# Patient Record
Sex: FEMALE | Race: WHITE | NOT HISPANIC OR LATINO | Employment: UNEMPLOYED | URBAN - METROPOLITAN AREA
[De-identification: names, ages, dates, MRNs, and addresses within clinical notes are randomized per-mention and may not be internally consistent; named-entity substitution may affect disease eponyms.]

---

## 2017-04-13 ENCOUNTER — ALLIED HEALTH/NURSE VISIT (OUTPATIENT)
Dept: CARDIOLOGY | Facility: CLINIC | Age: 47
End: 2017-04-13
Attending: INTERNAL MEDICINE
Payer: COMMERCIAL

## 2017-04-13 DIAGNOSIS — I46.9 CARDIAC ARREST (H): Primary | ICD-10-CM

## 2017-04-13 PROCEDURE — 93260 PRGRMG DEV EVAL IMPLTBL SYS: CPT | Mod: 26 | Performed by: INTERNAL MEDICINE

## 2017-04-13 PROCEDURE — 93260 PRGRMG DEV EVAL IMPLTBL SYS: CPT | Mod: ZF

## 2017-04-13 NOTE — PROGRESS NOTES
Pt seen in the [facility] for evaluation and iterative programming of a [employer], Subcutaneous ICD, per MD orders. Today her intrinsic rhythm is SR 64 bpm. Normal ICD function. No arrhythmias recorded. Pt reports that she is feeling well. Battery estimates 81% remaining to KAMRYN. Plan for pt to send a [employer] remote transmission on 7/13/17.

## 2017-04-13 NOTE — MR AVS SNAPSHOT
After Visit Summary   4/13/2017    Lauren Lewis    MRN: [id number]           Patient Information     Date Of Birth          1970        Visit Information        Provider Department      4/13/2017 8:30 AM 1, Uc Cv Device [facility]        Today's Diagnoses     Cardiac arrest (H)    -  1       Follow-ups after your visit        Follow-up notes from your care team     Return in about 6 months (around 10/13/2017) for [place] SQ ICD.      Who to contact     If you have questions or need follow up information about today's clinic visit or your schedule please contact [facility] directly at [phone].  Normal or non-critical lab and imaging results will be communicated to you by Zebra Technologieshart, letter or phone within 4 business days after the clinic has received the results. If you do not hear from us within 7 days, please contact the clinic through Petnett or phone. If you have a critical or abnormal lab result, we will notify you by phone as soon as possible.  Submit refill requests through Syncing.Net or call your pharmacy and they will forward the refill request to us. Please allow 3 business days for your refill to be completed.          Additional Information About Your Visit        MyChart Information     Syncing[employer]Net gives you secure access to your electronic health record. If you see a primary care provider, you can also send messages to your care team and make appointments. If you have questions, please call your primary care clinic.  If you do not have a primary care provider, please call [phone] and they will assist you.        Care EveryWhere ID     This is your Care EveryWhere ID. This could be used by other organizations to access your [place] medical records  [id number]         Blood Pressure from Last 3 Encounters:   12/13/16 128/85   12/08/15 118/80   10/27/15 116/65    Weight from Last 3 Encounters:   12/13/16 77.6 kg (171 lb)   12/08/15 76.3 kg (168 lb 3.2 oz)    10/13/15 77.2 kg (170 lb 4.8 oz)              We Performed the Following     HC PRGRMG DEV EVAL IMPLANTABLE SUBQ LEAD DFB SYSTEM        Primary Care Provider Office Phone # Fax #    Amanda Kimberley Italo Francisco [phone]       [address]  [facility] [address]        Thank you!     Thank you for choosing [facility]  for your care. Our goal is always to provide you with excellent care. Hearing back from our patients is one way we can continue to improve our services. Please take a few minutes to complete the written survey that you may receive in the mail after your visit with us. Thank you!             Your Updated Medication List - Protect others around you: Learn how to safely use, store and throw away your medicines at www.disposemymeds.org.          This list is accurate as of: 4/13/17 10:02 AM.  Always use your most recent med list.                   Brand Name Dispense Instructions for use    metoprolol 100 MG 24 hr tablet    TOPROL XL    90 tablet    Take 1 tablet (100 mg) by mouth daily       Multi-vitamin Tabs tablet      Take 1 tablet by mouth daily       OMEPRAZOLE          PAXIL 30 MG tablet   Generic drug:  PARoxetine      Take 1 tablet by mouth every morning       RECLIPSEN 0.15-30 MG-MCG per tablet   Generic drug:  desogestrel-ethinyl estradiol      Take 1 tablet by mouth daily As directed       SYNTHROID 100 MCG tablet   Generic drug:  levothyroxine      Take 1 tablet by mouth daily       VITAMIN D (CHOLECALCIFEROL) PO      Take 1 capsule by mouth daily

## 2017-06-01 ENCOUNTER — MEDICAL CORRESPONDENCE (OUTPATIENT)
Dept: HEALTH INFORMATION MANAGEMENT | Facility: CLINIC | Age: 47
End: 2017-06-01

## 2017-07-13 ENCOUNTER — TELEPHONE (OUTPATIENT)
Dept: CARDIOLOGY | Facility: CLINIC | Age: 47
End: 2017-07-13

## 2017-07-13 NOTE — TELEPHONE ENCOUNTER
Left voicemail message for patient to connect Latitude home monitor for her ICD check. She is scheduled for a routine ICD Latitude home monitor check today.

## 2017-08-11 ENCOUNTER — ALLIED HEALTH/NURSE VISIT (OUTPATIENT)
Dept: CARDIOLOGY | Facility: CLINIC | Age: 47
End: 2017-08-11
Attending: INTERNAL MEDICINE
Payer: COMMERCIAL

## 2017-08-11 DIAGNOSIS — I42.1 HOCM (HYPERTROPHIC OBSTRUCTIVE CARDIOMYOPATHY) (H): Primary | ICD-10-CM

## 2017-08-11 PROCEDURE — 93296 REM INTERROG EVL PM/IDS: CPT | Mod: ZF

## 2017-08-11 PROCEDURE — 93298 REM INTERROG DEV EVAL SCRMS: CPT | Performed by: INTERNAL MEDICINE

## 2017-08-11 NOTE — PROGRESS NOTES
Pt sent in a routine remote loop recorder check for evaluation per MD order.  Her ICD check does not show any episodes of ventricular arrhythmias.  Her ICD battery is OK and is estimating 77% left.  I spoke to the patient in clinic on 8/11/17 as she was her in clinic for a scheduled ICD check.  The clinic check was NOT done, as [employer] is writing a new software program for the MC2 ICD .  Until the software is ready, it is recommended pts NOT get their ICD checked in a clinical or surgical setting.  Pt states that she only lives 10 minutes away and she was very understanding about us canceling her clinic appt today.  We did go over her remote transmission information with her while she was here in clinic.      Remote ICD

## 2017-08-11 NOTE — MR AVS SNAPSHOT
After Visit Summary   8/11/2017    Lauren Lewis    MRN: [id number]           Patient Information     Date Of Birth          1970        Visit Information        Provider Department      8/11/2017 6:00 AM UC ICD REMOTE [facility]        Today's Diagnoses     HOCM (hypertrophic obstructive cardiomyopathy) (H)    -  1       Follow-ups after your visit        Your next 10 appointments already scheduled     Dec 12, 2017  9:30 AM CST   Ech Complete with UUECHR2   [facility] [place],  Echocardiography ([facility], [facility])    [address] [facility] [id number] [phone]           1. Please bring or wear a comfortable two-piece outfit. 2. You may eat, drink and take your normal medicines. 3. For any questions that cannot be answered, please contact the ordering physician            Dec 12, 2017 10:30 AM CST   Card Cardpul Stress Tst Adult with UUEKGS   [facility] ELECTROCARDIOLOGY ([facility], [facility])    [address] [facility] [address]               Dec 12, 2017  1:00 PM CST   (Arrive by 12:45 PM)   Implanted Defibulator with Uc Cv Device 1   [facility] ([facility])    [address] [place] [address] [phone]            Dec 12, 2017  1:30 PM CST   (Arrive by 1:15 PM)   Return Genetic with Belle Paz MD   [facility] ([facility])    [address] [place] [address] [phone]              Who to contact     If you have questions or need follow up information about today's clinic visit or your schedule please contact [facility] directly at [phone].  Normal or non-critical lab and imaging results will be communicated to you by MyChart, letter or phone within 4 business days after the clinic has received the results. If you do not hear from us within 7 days, please  contact the clinic through Freever or phone. If you have a critical or abnormal lab result, we will notify you by phone as soon as possible.  Submit refill requests through Freever or call your pharmacy and they will forward the refill request to us. Please allow 3 business days for your refill to be completed.          Additional Information About Your Visit        Tape TVhart Information     Freever gives you secure access to your electronic health record. If you see a primary care provider, you can also send messages to your care team and make appointments. If you have questions, please call your primary care clinic.  If you do not have a primary care provider, please call [phone] and they will assist you.        Care EveryWhere ID     This is your Care EveryWhere ID. This could be used by other organizations to access your [place] medical records  [id number]         Blood Pressure from Last 3 Encounters:   12/13/16 128/85   12/08/15 118/80   10/27/15 116/65    Weight from Last 3 Encounters:   12/13/16 77.6 kg (171 lb)   12/08/15 76.3 kg (168 lb 3.2 oz)   10/13/15 77.2 kg (170 lb 4.8 oz)              We Performed the Following     INTERROGATION DEVICE EVAL REMOTE, PACER/ICD        Primary Care Provider Office Phone # Fax #    Amanda Kimberley Francisco [phone]       [address] [place] [address] [address]  [facility] [place] [address]        Equal Access to Services     [facility] : Hadii aad ku hadasho Soomaali, waaxda luqadaha, qaybta kaalmada adeegyada, aimee tan . So [facility] [phone].    ATENCIÓN: Si habla español, tiene a huitron disposición servicios gratuitos de asistencia lingüística. Llame al [phone].    We comply with applicable federal civil rights laws and [place] laws. We do not discriminate on the basis of race, color, national origin, age, disability sex, sexual orientation or gender identity.            Thank you!     Thank you for choosing [facility]  CARE  for your care. Our goal is always to provide you with excellent care. Hearing back from our patients is one way we can continue to improve our services. Please take a few minutes to complete the written survey that you may receive in the mail after your visit with us. Thank you!             Your Updated Medication List - Protect others around you: Learn how to safely use, store and throw away your medicines at www.disposemymeds.org.          This list is accurate as of: 8/11/17  2:21 PM.  Always use your most recent med list.                   Brand Name Dispense Instructions for use Diagnosis    metoprolol 100 MG 24 hr tablet    TOPROL XL    90 tablet    Take 1 tablet (100 mg) by mouth daily    Hypertrophic cardiomyopathy (H)       Multi-vitamin Tabs tablet      Take 1 tablet by mouth daily        OMEPRAZOLE           PAXIL 30 MG tablet   Generic drug:  PARoxetine      Take 1 tablet by mouth every morning        RECLIPSEN 0.15-30 MG-MCG per tablet   Generic drug:  desogestrel-ethinyl estradiol      Take 1 tablet by mouth daily As directed        SYNTHROID 100 MCG tablet   Generic drug:  levothyroxine      Take 1 tablet by mouth daily        VITAMIN D (CHOLECALCIFEROL) PO      Take 1 capsule by mouth daily

## 2017-08-25 ENCOUNTER — MEDICAL CORRESPONDENCE (OUTPATIENT)
Dept: HEALTH INFORMATION MANAGEMENT | Facility: CLINIC | Age: 47
End: 2017-08-25

## 2017-09-12 ENCOUNTER — ALLIED HEALTH/NURSE VISIT (OUTPATIENT)
Dept: CARDIOLOGY | Facility: CLINIC | Age: 47
End: 2017-09-12
Attending: INTERNAL MEDICINE
Payer: COMMERCIAL

## 2017-09-12 DIAGNOSIS — I42.1 HOCM (HYPERTROPHIC OBSTRUCTIVE CARDIOMYOPATHY) (H): Primary | ICD-10-CM

## 2017-09-12 NOTE — PROGRESS NOTES
Pt seen in the [facility] for evaluation and iterative programming of a [employer], subcutaneous ICD, per MD orders. Today her intrinsic rhythm is SR 75 bpm. Normal ICD function. No arrhythmias recorded. Software update done. Pt reports that she is feeling well. Battery estimates 76% remaining to KAMRYN. Plan for pt to RTC in 3 months.  SQ ICD

## 2017-09-12 NOTE — MR AVS SNAPSHOT
After Visit Summary   9/12/2017    Lauren Lewis    MRN: [id number]           Patient Information     Date Of Birth          1970        Visit Information        Provider Department      9/12/2017 11:30 AM 1, Uc Cv Device [facility]        Today's Diagnoses     HOCM (hypertrophic obstructive cardiomyopathy) (H)    -  1       Follow-ups after your visit        Your next 10 appointments already scheduled     Dec 12, 2017  9:30 AM CST   Ech Complete with UUECHR2   [facility] [place],  Echocardiography ([facility])    [address] [facility] [id number] [phone]           1. Please bring or wear a comfortable two-piece outfit. 2. You may eat, drink and take your normal medicines. 3. For any questions that cannot be answered, please contact the ordering physician            Dec 12, 2017 10:30 AM CST   Card Cardpul Stress Tst Adult with UUEKGS   [facility] ELECTROCARDIOLOGY ([facility])    [address] [address]               Dec 12, 2017  1:00 PM CST   (Arrive by 12:45 PM)   Implanted Defibulator with Uc Cv Device 1   [facility] ([facility])    [address] [place] [address] [phone]            Dec 12, 2017  1:30 PM CST   (Arrive by 1:15 PM)   Return Genetic with Belle Paz MD   [facility] ([facility])    [address] [place] [address] [phone]              Who to contact     If you have questions or need follow up information about today's clinic visit or your schedule please contact [facility] directly at [phone].  Normal or non-critical lab and imaging results will be communicated to you by MyChart, letter or phone within 4 business days after the clinic has received the results. If you do not hear from us within 7 days, please  contact the clinic through [employer] or phone. If you have a critical or abnormal lab result, we will notify you by phone as soon as possible.  Submit refill requests through [employer] or call your pharmacy and they will forward the refill request to us. Please allow 3 business days for your refill to be completed.          Additional Information About Your Visit        LEAF Commercial Capitalhart Information     [employer] gives you secure access to your electronic health record. If you see a primary care provider, you can also send messages to your care team and make appointments. If you have questions, please call your primary care clinic.  If you do not have a primary care provider, please call [phone] and they will assist you.        Care EveryWhere ID     This is your Care EveryWhere ID. This could be used by other organizations to access your [place] medical records  [id number]         Blood Pressure from Last 3 Encounters:   12/13/16 128/85   12/08/15 118/80   10/27/15 116/65    Weight from Last 3 Encounters:   12/13/16 77.6 kg (171 lb)   12/08/15 76.3 kg (168 lb 3.2 oz)   10/13/15 77.2 kg (170 lb 4.8 oz)              Today, you had the following     No orders found for display       Primary Care Provider Office Phone # Fax #    Amanda Kimberley Francisco [phone]       [address] [place] [address] [address]  [facility] [address]        Equal Access to Services     [facility]: Hadii ayla ku hadasho Sonikoleali, waaxda luqadaha, qaybta kaalmada adesherly, aimee tan . So [facility] [place] [phone].    ATENCIÓN: Si habla español, tiene a huitron disposición servicios gratuitos de asistencia lingüística. Llame al [phone].    We comply with applicable federal civil rights laws and [place] laws. We do not discriminate on the basis of race, color, national origin, age, disability sex, sexual orientation or gender identity.            Thank you!     Thank you for choosing [facility]  for your  care. Our goal is always to provide you with excellent care. Hearing back from our patients is one way we can continue to improve our services. Please take a few minutes to complete the written survey that you may receive in the mail after your visit with us. Thank you!             Your Updated Medication List - Protect others around you: Learn how to safely use, store and throw away your medicines at www.disposemymeds.org.          This list is accurate as of: 9/12/17  1:11 PM.  Always use your most recent med list.                   Brand Name Dispense Instructions for use Diagnosis    metoprolol 100 MG 24 hr tablet    TOPROL XL    90 tablet    Take 1 tablet (100 mg) by mouth daily    Hypertrophic cardiomyopathy (H)       Multi-vitamin Tabs tablet      Take 1 tablet by mouth daily        OMEPRAZOLE           PAXIL 30 MG tablet   Generic drug:  PARoxetine      Take 1 tablet by mouth every morning        RECLIPSEN 0.15-30 MG-MCG per tablet   Generic drug:  desogestrel-ethinyl estradiol      Take 1 tablet by mouth daily As directed        SYNTHROID 100 MCG tablet   Generic drug:  levothyroxine      Take 1 tablet by mouth daily        VITAMIN D (CHOLECALCIFEROL) PO      Take 1 capsule by mouth daily

## 2017-12-11 ENCOUNTER — PRE VISIT (OUTPATIENT)
Dept: CARDIOLOGY | Facility: CLINIC | Age: 47
End: 2017-12-11

## 2017-12-12 ENCOUNTER — HOSPITAL ENCOUNTER (OUTPATIENT)
Dept: CARDIOLOGY | Facility: CLINIC | Age: 47
Discharge: HOME | End: 2017-12-12
Attending: INTERNAL MEDICINE | Admitting: INTERNAL MEDICINE
Payer: COMMERCIAL

## 2017-12-12 ENCOUNTER — OFFICE VISIT (OUTPATIENT)
Dept: CARDIOLOGY | Facility: CLINIC | Age: 47
End: 2017-12-12
Attending: INTERNAL MEDICINE
Payer: COMMERCIAL

## 2017-12-12 VITALS
WEIGHT: 179.9 LBS | DIASTOLIC BLOOD PRESSURE: 73 MMHG | SYSTOLIC BLOOD PRESSURE: 104 MMHG | OXYGEN SATURATION: 97 % | BODY MASS INDEX: 29.97 KG/M2 | HEART RATE: 83 BPM | HEIGHT: 65 IN

## 2017-12-12 DIAGNOSIS — I42.2 HYPERTROPHIC CARDIOMYOPATHY (H): ICD-10-CM

## 2017-12-12 DIAGNOSIS — I42.1 HOCM (HYPERTROPHIC OBSTRUCTIVE CARDIOMYOPATHY) (H): Primary | ICD-10-CM

## 2017-12-12 LAB
ALBUMIN SERPL-MCNC: 3.3 G/DL (ref 3.4–5)
ALP SERPL-CCNC: 59 U/L (ref 40–150)
ALT SERPL W P-5'-P-CCNC: 22 U/L (ref 0–50)
ANION GAP SERPL CALCULATED.3IONS-SCNC: 8 MMOL/L (ref 3–14)
AST SERPL W P-5'-P-CCNC: 18 U/L (ref 0–45)
BILIRUB SERPL-MCNC: 0.4 MG/DL (ref 0.2–1.3)
BUN SERPL-MCNC: 14 MG/DL (ref 7–30)
CALCIUM SERPL-MCNC: 8.6 MG/DL (ref 8.5–10.1)
CHLORIDE SERPL-SCNC: 104 MMOL/L (ref 94–109)
CO2 SERPL-SCNC: 25 MMOL/L (ref 20–32)
CREAT SERPL-MCNC: 1.06 MG/DL (ref 0.52–1.04)
ERYTHROCYTE [DISTWIDTH] IN BLOOD BY AUTOMATED COUNT: 11.7 % (ref 10–15)
GFR SERPL CREATININE-BSD FRML MDRD: 55 ML/MIN/1.7M2
GLUCOSE SERPL-MCNC: 85 MG/DL (ref 70–99)
HCT VFR BLD AUTO: 42.3 % (ref 35–47)
HGB BLD-MCNC: 14.2 G/DL (ref 11.7–15.7)
MCH RBC QN AUTO: 31.8 PG (ref 26.5–33)
MCHC RBC AUTO-ENTMCNC: 33.6 G/DL (ref 31.5–36.5)
MCV RBC AUTO: 95 FL (ref 78–100)
PLATELET # BLD AUTO: 207 10E9/L (ref 150–450)
POTASSIUM SERPL-SCNC: 3.9 MMOL/L (ref 3.4–5.3)
PROT SERPL-MCNC: 6.7 G/DL (ref 6.8–8.8)
RBC # BLD AUTO: 4.46 10E12/L (ref 3.8–5.2)
SODIUM SERPL-SCNC: 137 MMOL/L (ref 133–144)
T4 FREE SERPL-MCNC: 1.24 NG/DL (ref 0.76–1.46)
TSH SERPL DL<=0.005 MIU/L-ACNC: 4.2 MU/L (ref 0.4–4)
WBC # BLD AUTO: 8.2 10E9/L (ref 4–11)

## 2017-12-12 PROCEDURE — 99214 OFFICE O/P EST MOD 30 MIN: CPT | Mod: 25 | Performed by: INTERNAL MEDICINE

## 2017-12-12 PROCEDURE — 94621 CARDIOPULM EXERCISE TESTING: CPT | Mod: 26 | Performed by: INTERNAL MEDICINE

## 2017-12-12 PROCEDURE — 93306 TTE W/DOPPLER COMPLETE: CPT | Mod: 26 | Performed by: INTERNAL MEDICINE

## 2017-12-12 PROCEDURE — 93306 TTE W/DOPPLER COMPLETE: CPT

## 2017-12-12 PROCEDURE — 93005 ELECTROCARDIOGRAM TRACING: CPT | Mod: ZF

## 2017-12-12 PROCEDURE — 36415 COLL VENOUS BLD VENIPUNCTURE: CPT | Performed by: INTERNAL MEDICINE

## 2017-12-12 PROCEDURE — 80053 COMPREHEN METABOLIC PANEL: CPT | Performed by: INTERNAL MEDICINE

## 2017-12-12 PROCEDURE — 93010 ELECTROCARDIOGRAM REPORT: CPT | Mod: ZP | Performed by: INTERNAL MEDICINE

## 2017-12-12 PROCEDURE — 84439 ASSAY OF FREE THYROXINE: CPT | Performed by: INTERNAL MEDICINE

## 2017-12-12 PROCEDURE — 85027 COMPLETE CBC AUTOMATED: CPT | Performed by: INTERNAL MEDICINE

## 2017-12-12 PROCEDURE — 84443 ASSAY THYROID STIM HORMONE: CPT | Performed by: INTERNAL MEDICINE

## 2017-12-12 PROCEDURE — 99212 OFFICE O/P EST SF 10 MIN: CPT | Mod: 25,ZF

## 2017-12-12 RX ORDER — SIMVASTATIN 20 MG
20 TABLET ORAL AT BEDTIME
COMMUNITY
Start: 2017-07-12

## 2017-12-12 RX ORDER — METOPROLOL SUCCINATE 100 MG/1
100 TABLET, EXTENDED RELEASE ORAL DAILY
Qty: 90 TABLET | Refills: 3 | Status: SHIPPED | OUTPATIENT
Start: 2017-12-12 | End: 2018-12-19

## 2017-12-12 ASSESSMENT — PAIN SCALES - GENERAL: PAINLEVEL: NO PAIN (0)

## 2017-12-12 NOTE — LETTER
12/12/2017      RE: Lauren Lewis  [address] [address]       Dear Colleague,    Thank you for the opportunity to participate in the care of your patient, Lauren Lewis, at the [facility] HEART [facility]. Please see a copy of my visit note below.    HPI:  47-year-old lady with a history of hypertrophic cardiomyopathy with  MYBPC3 mutation, history of V-fib arrest s/p ICD presents for ongoing evaluation and management.  Pt reports that she was been feeling well.   Patient feels very well without chest pain on exertion, orthopnea, paroxysmal nocturnal dyspnea, presyncope, syncope, palpitations or ICD firing.  She denies any problems with her medication, tolerated increase in metoprolol xl at last visit without issues and reports compliance.      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  Fortunately, her children, who are aged 15 and 12, did not have this gene mutation.    SOCIAL HISTORY:  Social History     Social History     Marital Status:      Spouse Name: N/A     Number of Children:  "N/A     Years of Education: N/A     Social History Main Topics     Smoking status: Never Smoker      Smokeless tobacco: None     Alcohol Use: No     Drug Use: No     Sexual Activity:     Partners: Male     Other Topics Concern     None     Social History Narrative       CURRENT MEDICATIONS:  Current Outpatient Prescriptions   Medication Sig Dispense Refill     simvastatin (ZOCOR) 20 MG tablet Take 20 mg by mouth       metoprolol (TOPROL-XL) 100 MG 24 hr tablet Take 1 tablet (100 mg) by mouth daily 90 tablet 3     multivitamin, therapeutic with minerals (MULTI-VITAMIN) TABS Take 1 tablet by mouth daily       levothyroxine (SYNTHROID) 100 MCG tablet Take 1 tablet by mouth daily       PARoxetine (PAXIL) 30 MG tablet Take 1 tablet by mouth every morning       desogestrel-ethinyl estradiol (RECLIPSEN) 0.15-30 MG-MCG per tablet Take 1 tablet by mouth daily As directed       OMEPRAZOLE        VITAMIN D, CHOLECALCIFEROL, PO Take 1 capsule by mouth daily          ROS:   Constitutional: No fever, chills, or sweats. No weight gain/loss.   ENT: No visual disturbance, ear ache, epistaxis, sore throat.   Allergies/Immunologic: Negative.   Respiratory: No cough, hemoptysis.   Cardiovascular: As per HPI.   GI: No nausea, vomiting, hematemesis, melena, or hematochezia.   : No urinary frequency, dysuria, or hematuria.   Integument: Negative.   Psychiatric: Negative.   Neuro: Negative.   Endocrinology: Negative.   Musculoskeletal: Negative.    EXAM:  /73 (BP Location: Left arm, Patient Position: Chair, Cuff Size: Adult Regular)  Pulse 83  Ht 1.651 m (5' 5\")  Wt 81.6 kg (179 lb 14.4 oz)  SpO2 97%  BMI 29.94 kg/m2  General: appears comfortable, alert and articulate  Head: normocephalic, atraumatic  Eyes: anicteric sclera, EOMI  Neck: no adenopathy, 2+ carotids  Orophyarynx: moist mucosa, no lesions, dentition intact  Heart: regular, S1/S2, no murmur, gallop, rub, estimated JVP 7cm.  Lungs: clear, no rales or " wheezing  Abdomen: soft, non-tender, bowel sounds present, no hepatomegaly  Extremities: no clubbing, cyanosis or edema  Neurological: normal speech and affect, no gross motor deficits    Labs:  CBC RESULTS:  Lab Results   Component Value Date    WBC 8.2 12/12/2017    RBC 4.46 12/12/2017    HGB 14.2 12/12/2017    HCT 42.3 12/12/2017    MCV 95 12/12/2017    MCH 31.8 12/12/2017    MCHC 33.6 12/12/2017    RDW 11.7 12/12/2017     12/12/2017       CMP RESULTS:  Lab Results   Component Value Date     12/12/2017    POTASSIUM 3.9 12/12/2017    CHLORIDE 104 12/12/2017    CO2 25 12/12/2017    ANIONGAP 8 12/12/2017    GLC 85 12/12/2017    BUN 14 12/12/2017    CR 1.06 (H) 12/12/2017    GFRESTIMATED 55 (L) 12/12/2017    GFRESTBLACK 67 12/12/2017    ZAKIA 8.6 12/12/2017    BILITOTAL 0.4 12/12/2017    ALBUMIN 3.3 (L) 12/12/2017    ALKPHOS 59 12/12/2017    ALT 22 12/12/2017    AST 18 12/12/2017              Echo today  Interpretation Summary  Normal left ventricular size, thickness, and function. EF 55-60%. Normal left  ventricular filling for age. No regional wall motion abnormalities are seen.  Normal right ventricular size and function.  No significant valvular pathology.  The inferior vena cava was normal in size with preserved respiratory variability. No pericardial effusion.   No change from prior study 12/13/16.      Assessment and Plan:  47-year-old lady with a history of hypertrophic cardiomyopathy with  MYBPC3 mutation, history of V-fib arrest s/p ICD presents for ongoing evaluation and management.   - hypertrophic cardiomyopathy with  MYBPC3 mutation, history of V-fib arrest s/p ICD:  The patient continues to feel well.  Her CPX test today confirms acceptable exercise tolerance and no evidence of hypotension or significant arrhythmias with exercise.  Echo remains grossly stable as documented above. Will continue metoprolol xl 100 mg daily.   Pt aware of hypertrophic cardiomyopathy exercise restrictions.  Pt  also aware of recommendation for all first degree relatives to undergo clinical screening unless they opt to pursue genetic testing and are found to be gene negative as with her children,         Follow-up:  2 years with an echo and device check.  Will be happy to see sooner if change in clinical status or new questions/concerns arise.        Belle Paz MD  Section Head - Advanced Heart Failure, Transplantation and Mechanical Circulatory Support  Co-Director - Adult Congenital and Cardiovascular Genetics Center  Associate Professor of Medicine, [facility]

## 2017-12-12 NOTE — NURSING NOTE
Chief Complaint   Patient presents with     Follow Up For     heart problem - 47 year old female with hypertrophic cardiomyopathy s/p Vfib arrest s/p ICD placement presenting for follow up     Vitals were taken and medications were reconciled.    Brooke Mendez CMA     12:39 PM

## 2017-12-12 NOTE — MR AVS SNAPSHOT
After Visit Summary   12/12/2017    Lauren Lewis    MRN: [id number]           Patient Information     Date Of Birth          1970        Visit Information        Provider Department      12/12/2017 1:00 PM 1, Beatriz [facility]        Today's Diagnoses     HOCM (hypertrophic obstructive cardiomyopathy) (H)    -  1      Care Instructions    It was a pleasure to see you in clinic today.  Please do not hesitate to call us if you have any questions or concerns.  Please return to clinic in 1 year for a ICD check.        Paulina Waters R.N.  Electrophysiology nurse specialist  [facility]  [address] [place] [address]     Valerie Hidalgo  [phone] business hours    After business hours please call [phone], option #4, and ask for Job code [id number].                  Follow-ups after your visit        Additional Services     Follow-Up with Device Clinic           Follow-Up with Device Clinic - 1 year                 Follow-up notes from your care team     Discussed this visit Return for ICD check, Dr Paz.      Your next 10 appointments already scheduled     Dec 12, 2017  1:30 PM CST   (Arrive by 1:15 PM)   Return Genetic with Belle Paz MD   [facility] ([facility])    [address] [place] [address] [phone]              Future tests that were ordered for you today     Open Future Orders        Priority Expected Expires Ordered    Follow-Up with Device Clinic Routine 12/12/2017 3/12/2018 12/12/2017    Follow-Up with Device Clinic - 1 year Routine 12/12/2018 12/27/2019 12/12/2017            Who to contact     If you have questions or need follow up information about today's clinic visit or your schedule please contact [facility] directly at [phone].  Normal or non-critical lab and imaging results will be communicated to you by Suhas  letter or phone within 4 business days after the clinic has received the results. If you do not hear from us within 7 days, please contact the clinic through Avidia or phone. If you have a critical or abnormal lab result, we will notify you by phone as soon as possible.  Submit refill requests through Avidia or call your pharmacy and they will forward the refill request to us. Please allow 3 business days for your refill to be completed.          Additional Information About Your Visit        Lavish SkateharJackpocket Information     Avidia gives you secure access to your electronic health record. If you see a primary care provider, you can also send messages to your care team and make appointments. If you have questions, please call your primary care clinic.  If you do not have a primary care provider, please call [phone] and they will assist you.        Care EveryWhere ID     This is your Care EveryWhere ID. This could be used by other organizations to access your [place] medical records  [id number]         Blood Pressure from Last 3 Encounters:   12/13/16 128/85   12/08/15 118/80   10/27/15 116/65    Weight from Last 3 Encounters:   12/13/16 77.6 kg (171 lb)   12/08/15 76.3 kg (168 lb 3.2 oz)   10/13/15 77.2 kg (170 lb 4.8 oz)               Primary Care Provider Office Phone # Fax #    Amanda Kimberley Francisco [phone]       [address] [place] [address] [address] [place] [address]        Equal Access to Services     GUEVARA TUCKER AH: Hadii aad ku hadasho Somounika, waaxda luqadaha, qaybta kaalmada cheyyadelmi, aimee travis. So [facility] [phone].    ATENCIÓN: Si habla español, tiene a huitron disposición servicios gratuitos de asistencia lingüística. Llame al [phone].    We comply with applicable federal civil rights laws and [place] laws. We do not discriminate on the basis of race, color, national origin, age, disability, sex, sexual orientation, or gender identity.             Thank you!     Thank you for choosing [facility]  for your care. Our goal is always to provide you with excellent care. Hearing back from our patients is one way we can continue to improve our services. Please take a few minutes to complete the written survey that you may receive in the mail after your visit with us. Thank you!             Your Updated Medication List - Protect others around you: Learn how to safely use, store and throw away your medicines at www.disposemymeds.org.          This list is accurate as of: 12/12/17  1:02 PM.  Always use your most recent med list.                   Brand Name Dispense Instructions for use Diagnosis    metoprolol 100 MG 24 hr tablet    TOPROL XL    90 tablet    Take 1 tablet (100 mg) by mouth daily    Hypertrophic cardiomyopathy (H)       Multi-vitamin Tabs tablet      Take 1 tablet by mouth daily        OMEPRAZOLE           PAXIL 30 MG tablet   Generic drug:  PARoxetine      Take 1 tablet by mouth every morning        RECLIPSEN 0.15-30 MG-MCG per tablet   Generic drug:  desogestrel-ethinyl estradiol      Take 1 tablet by mouth daily As directed        simvastatin 20 MG tablet    ZOCOR     Take 20 mg by mouth        SYNTHROID 100 MCG tablet   Generic drug:  levothyroxine      Take 1 tablet by mouth daily        VITAMIN D (CHOLECALCIFEROL) PO      Take 1 capsule by mouth daily

## 2017-12-12 NOTE — MR AVS SNAPSHOT
"              After Visit Summary   12/12/2017    Lauren Lewis    MRN: [id number]           Patient Information     Date Of Birth          1970        Visit Information        Provider Department      12/12/2017 1:30 PM Belle Paz MD [facility]        Today's Diagnoses     Hypertrophic cardiomyopathy (H)          Care Instructions    You were seen today in the Adult Congenital and Cardiovascular Genetics Clinic at the [facility].    Cardiology Providers you saw during your visit:  Dr. Belle Paz     Diagnosis:  Hypertrophic Cardiomyopathy    Results:  The results of your echo and stress test were discussed with you today.    Recommendations:    1.  Continue to eat a heart healthy, low salt diet.  2.  Continue to get 20-30 minutes of aerobic activity, 4-5 days per week.  Examples of aerobic activity include walking, running, swimming, cycling, etc.  3.  Continue to observe good oral hygiene, with regular dental visits.  4.  Please have labs drawn today (CBC, TSH and CMP).  5. Continue your regular device checks.       Vitals:    12/12/17 1234   BP: 104/73   BP Location: Left arm   Patient Position: Chair   Cuff Size: Adult Regular   Pulse: 83   SpO2: 97%   Weight: 81.6 kg (179 lb 14.4 oz)   Height: 1.651 m (5' 5\")       Exercise restrictions:   Yes__X__  No____         If yes, list restrictions:  Must be allowed to rest if fatigued or SOB, follow HOCM exercise restrictions.      Work restrictions:  Yes____  No____         If yes, list restrictions:    FASTING CHOLESTEROL was checked in the last 5 years YES_X__  NO___ 2017  Continue to eat a heart healthy, low salt diet.         ____ Fasting lipid panel order today         ____ No changes in medications          ____ I recommend the following changes in your cholesterol medications.:          ____ Please follow up for cholesterol screening at your primary care physician      Follow-up:  Follow up with Dr. Paz in 2 " years with an echo and device check.     For after hours urgent needs, call [phone] and ask to speak to the Adult Congenital Physician on call.  Mention Job Code 0401.    For emergencies call 911.    For any scheduling needs and to contact your nurse in the Adult Congenital and Cardiovascular Genetics Clinic, please call Davian Fish, Procedure , at [phone].    Thank you for your visit today!  If you have questions or concerns about today's visit, please call me.    Ngozi Ly RN, BSN  Cardiology Care Coordinator  [facility]  [email or web address]  Ph.[phone]    [facility]  [facility]  Mail Code [id number]  [address] [place]  [address] d          Follow-ups after your visit        Additional Services     Follow-Up with Cardiologist           Follow-Up with Device Clinic                 Your next 10 appointments already scheduled     Dec 12, 2017  3:00 PM CST   Lab with  LAB    [facility] ([facility])    [address] [place] [address] [phone]              Future tests that were ordered for you today     Open Future Orders        Priority Expected Expires Ordered    TSH with free T4 reflex Routine 12/12/2017 12/12/2018 12/12/2017    CBC with platelets Routine 12/12/2017 12/12/2018 12/12/2017    Comprehensive metabolic panel Routine 12/12/2017 12/12/2018 12/12/2017    Follow-Up with Cardiologist Routine 12/12/2019 12/13/2019 12/12/2017    Follow-Up with Device Clinic Routine 12/12/2019 12/13/2019 12/12/2017    Follow-Up with Device Clinic Routine 12/12/2017 3/12/2018 12/12/2017    Follow-Up with Device Clinic - 1 year Routine 12/12/2018 12/27/2019 12/12/2017            Who to contact     If you have questions or need follow up information about today's clinic visit or your schedule please contact [facility] directly at [phone].  Normal or non-critical lab and imaging results will be communicated to you by MyChart, letter or phone within 4 business days after the clinic has received the results. If you do not hear from us within 7 days, please contact the clinic through Llesianthart or phone. If you have a critical or abnormal lab result, we will notify you by phone as soon as possible.  Submit refill requests through Chatosity or call your pharmacy and they will forward the refill request to us. Please allow 3 business days for your refill to be completed.          Additional Information About Your Visit        LlesiantharGentronix Information     Chatosity gives you secure access to your electronic health record. If you see a primary care provider, you can also send messages to your care team and make appointments. If you have questions, please call your primary care clinic.  If you do not have a primary care provider, please call [phone] and they will assist you.        Care EveryWhere ID     This is your Care EveryWhere ID. This could be used by other organizations to access your [place] medical records  [id number]        Your Vitals Were     Pulse Height Pulse Oximetry BMI (Body Mass Index)          83 1.651 m (5' 5\") 97% 29.94 kg/m2         Blood Pressure from Last 3 Encounters:   12/12/17 104/73   12/13/16 128/85   12/08/15 118/80    Weight from Last 3 Encounters:   12/12/17 81.6 kg (179 lb 14.4 oz)   12/13/16 77.6 kg (171 lb)   12/08/15 76.3 kg (168 lb 3.2 oz)                 Where to get your medicines      These medications were sent to [facility] - [place], [place] - [address] [facility] [address] [facility]  [address] [facility]   [address] [facility] [address]     Phone:  [phone]     metoprolol 100 MG 24 hr tablet          Primary Care Provider Office Phone # Fax #    Amanda Kimberley Francisco [phone]       [address] [place] [address]" [phone]        Equal Access to Services     [facility]: Hadii aad svitlana samantha Ayers, waviolada luqnoe, qaybta severobarbiaimee basilio. So [facility] [phone].    ATENCIÓN: Si habla español, tiene a huitron disposición servicios gratuitos de asistencia lingüística. Llame al [phone].    We comply with applicable federal civil rights laws and [place] laws. We do not discriminate on the basis of race, color, national origin, age, disability, sex, sexual orientation, or gender identity.            Thank you!     Thank you for choosing [facility]  for your care. Our goal is always to provide you with excellent care. Hearing back from our patients is one way we can continue to improve our services. Please take a few minutes to complete the written survey that you may receive in the mail after your visit with us. Thank you!             Your Updated Medication List - Protect others around you: Learn how to safely use, store and throw away your medicines at www.disposemymeds.org.          This list is accurate as of: 12/12/17  2:23 PM.  Always use your most recent med list.                   Brand Name Dispense Instructions for use Diagnosis    metoprolol 100 MG 24 hr tablet    TOPROL-XL    90 tablet    Take 1 tablet (100 mg) by mouth daily    Hypertrophic cardiomyopathy (H)       Multi-vitamin Tabs tablet      Take 1 tablet by mouth daily        OMEPRAZOLE           PAXIL 30 MG tablet   Generic drug:  PARoxetine      Take 1 tablet by mouth every morning        RECLIPSEN 0.15-30 MG-MCG per tablet   Generic drug:  desogestrel-ethinyl estradiol      Take 1 tablet by mouth daily As directed        simvastatin 20 MG tablet    ZOCOR     Take 20 mg by mouth        SYNTHROID 100 MCG tablet   Generic drug:  levothyroxine      Take 1 tablet by mouth daily        VITAMIN D (CHOLECALCIFEROL) PO      Take 1 capsule by mouth daily

## 2017-12-12 NOTE — PATIENT INSTRUCTIONS
It was a pleasure to see you in clinic today.  Please do not hesitate to call us if you have any questions or concerns.  Please return to clinic in 1 year for a ICD check.        Paulina Waters R.N.  Electrophysiology nurse specialist  [facility]  [address] [facility] [address]     Valerie Hidalgo  [phone] business hours    After business hours please call [phone], option #4, and ask for Job code [id number].

## 2017-12-12 NOTE — PATIENT INSTRUCTIONS
"You were seen today in the Adult Congenital and Cardiovascular Genetics Clinic at the [facility].    Cardiology Providers you saw during your visit:  Dr. Belle Paz     Diagnosis:  Hypertrophic Cardiomyopathy    Results:  The results of your echo and stress test were discussed with you today.    Recommendations:    1.  Continue to eat a heart healthy, low salt diet.  2.  Continue to get 20-30 minutes of aerobic activity, 4-5 days per week.  Examples of aerobic activity include walking, running, swimming, cycling, etc.  3.  Continue to observe good oral hygiene, with regular dental visits.  4.  Please have labs drawn today (CBC, TSH and CMP).  5. Continue your regular device checks.       Vitals:    12/12/17 1234   BP: 104/73   BP Location: Left arm   Patient Position: Chair   Cuff Size: Adult Regular   Pulse: 83   SpO2: 97%   Weight: 81.6 kg (179 lb 14.4 oz)   Height: 1.651 m (5' 5\")       Exercise restrictions:   Yes__X__  No____         If yes, list restrictions:  Must be allowed to rest if fatigued or SOB, follow HOCM exercise restrictions.      Work restrictions:  Yes____  No____         If yes, list restrictions:    FASTING CHOLESTEROL was checked in the last 5 years YES_X__  NO___ 2017  Continue to eat a heart healthy, low salt diet.         ____ Fasting lipid panel order today         ____ No changes in medications          ____ I recommend the following changes in your cholesterol medications.:          ____ Please follow up for cholesterol screening at your primary care physician      Follow-up:  Follow up with Dr. Paz in 2 years with an echo and device check.     For after hours urgent needs, call [phone] and ask to speak to the Adult Congenital Physician on call.  Mention Job Code 0401.    For emergencies call 911.    For any scheduling needs and to contact your nurse in the Adult Congenital and Cardiovascular Genetics Clinic, please call Davian Fish, Procedure , at " [phone].    Thank you for your visit today!  If you have questions or concerns about today's visit, please call me.    Ngozi Ly RN, BSN  Cardiology Care Coordinator  [facility]  [email or web address]  Ph.[phone]    [facility]  [facility]  Mail Code [id number]  [address] [place]  [address] d

## 2017-12-12 NOTE — PROGRESS NOTES
Pt seen in clinic for evaluation and iterative programming of her [employer] SQ ICD per MD order.  She looks well and she states that she has been doing well and she has a routine appt to see Dr Paz in clinic today.  Her ICD check does not show any episodes of arrhythmias, her impedence is good, the battery has 79% remaining.  Normal ICD function.  We will plan to see her back in clinic in 1 year with remotes q 3 mos.    SQ ICD
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
## 2018-03-13 ENCOUNTER — ALLIED HEALTH/NURSE VISIT (OUTPATIENT)
Dept: CARDIOLOGY | Facility: CLINIC | Age: 48
End: 2018-03-13
Attending: INTERNAL MEDICINE
Payer: COMMERCIAL

## 2018-03-13 DIAGNOSIS — I42.1 HOCM (HYPERTROPHIC OBSTRUCTIVE CARDIOMYOPATHY) (H): Primary | ICD-10-CM

## 2018-03-13 PROCEDURE — 93296 REM INTERROG EVL PM/IDS: CPT | Mod: ZF

## 2018-03-13 PROCEDURE — 93295 DEV INTERROG REMOTE 1/2/MLT: CPT | Performed by: INTERNAL MEDICINE

## 2018-03-13 NOTE — MR AVS SNAPSHOT
After Visit Summary   3/13/2018    Lauren Lewis    MRN: [id number]           Patient Information     Date Of Birth          1970        Visit Information        Provider Department      3/13/2018 6:00 AM [facility] ICD REMOTE [facility]        Today's Diagnoses     HOCM (hypertrophic obstructive cardiomyopathy) (H)    -  1      Care Instructions    It was a pleasure to see you in clinic today.  Please do not hesitate to call with any questions or concerns.  You are scheduled for a remote transmission on 6/25/18.  We look forward to seeing you in clinic at your next device check in 6 months.    Ade Monreal, RN, MS, CCRN  Electrophysiology Nurse Clinician  [facility]    During Business Hours Please Call:  [phone]  After Hours Please Call:  [phone] - select option #4 and ask for job code [phone]                        Follow-ups after your visit        Who to contact     If you have questions or need follow up information about today's clinic visit or your schedule please contact [facility] directly at [phone].  Normal or non-critical lab and imaging results will be communicated to you by Evedhart, letter or phone within 4 business days after the clinic has received the results. If you do not hear from us within 7 days, please contact the clinic through Compasst or phone. If you have a critical or abnormal lab result, we will notify you by phone as soon as possible.  Submit refill requests through [employer] or call your pharmacy and they will forward the refill request to us. Please allow 3 business days for your refill to be completed.          Additional Information About Your Visit        EvedharTempoIQ Information     [employer] gives you secure access to your electronic health record. If you see a primary care provider, you can also send messages to your care team and make appointments. If you have questions, please call your primary care clinic.  If you  do not have a primary care provider, please call [phone] and they will assist you.        Care EveryWhere ID     This is your Care EveryWhere ID. This could be used by other organizations to access your [place] medical records  [id number]         Blood Pressure from Last 3 Encounters:   12/12/17 104/73   12/13/16 128/85   12/08/15 118/80    Weight from Last 3 Encounters:   12/12/17 81.6 kg (179 lb 14.4 oz)   12/13/16 77.6 kg (171 lb)   12/08/15 76.3 kg (168 lb 3.2 oz)              We Performed the Following     INTERROGATION DEVICE EVAL REMOTE, PACER/ICD        Primary Care Provider Office Phone # Fax #    Amanda Kimberley Francisco [phone]       [address] [place] [phone]  [facility] [address]        Equal Access to Services     [facility]: Hadii aad ku hadasho Sonikoleali, waaxda luqadaha, qaybta kaalmada adeegyada, waxay frankiein hayaan chey tan . So [facility] [phone].    ATENCIÓN: Si habla español, tiene a huitron disposición servicios gratuitos de asistencia lingüística. Annabelle al [phone].    We comply with applicable federal civil rights laws and [place] laws. We do not discriminate on the basis of race, color, national origin, age, disability, sex, sexual orientation, or gender identity.            Thank you!     Thank you for choosing [facility]  for your care. Our goal is always to provide you with excellent care. Hearing back from our patients is one way we can continue to improve our services. Please take a few minutes to complete the written survey that you may receive in the mail after your visit with us. Thank you!             Your Updated Medication List - Protect others around you: Learn how to safely use, store and throw away your medicines at www.disposemymeds.org.          This list is accurate as of 3/13/18 11:59 PM.  Always use your most recent med list.                   Brand Name Dispense Instructions for use Diagnosis    metoprolol succinate  100 MG 24 hr tablet    TOPROL-XL    90 tablet    Take 1 tablet (100 mg) by mouth daily    Hypertrophic cardiomyopathy (H)       Multi-vitamin Tabs tablet      Take 1 tablet by mouth daily        OMEPRAZOLE           PAXIL 30 MG tablet   Generic drug:  PARoxetine      Take 1 tablet by mouth every morning        RECLIPSEN 0.15-30 MG-MCG per tablet   Generic drug:  desogestrel-ethinyl estradiol      Take 1 tablet by mouth daily As directed        simvastatin 20 MG tablet    ZOCOR     Take 20 mg by mouth        SYNTHROID 100 MCG tablet   Generic drug:  levothyroxine      Take 1 tablet by mouth daily        VITAMIN D (CHOLECALCIFEROL) PO      Take 1 capsule by mouth daily

## 2018-03-20 NOTE — PATIENT INSTRUCTIONS
It was a pleasure to see you in clinic today.  Please do not hesitate to call with any questions or concerns.  You are scheduled for a remote transmission on 6/25/18.  We look forward to seeing you in clinic at your next device check in 6 months.    Ade Monreal, RN, MS, CCRN  Electrophysiology Nurse Clinician  [facility]    During Business Hours Please Call:  [phone]  After Hours Please Call:  [phone] - select option #4 and ask for job code [phone]

## 2018-03-20 NOTE — PROGRESS NOTES
Preliminary Device Interrogation Results.  Final physician signed paceart report to be scanned and attached.    Remote ICD transmission received and reviewed.  Device transmission sent per MD orders.  Patient has a [employer] SQ ICD.  Normal ICD function.  No arrythmias recorded.  Presenting EGM = NSR @ 77 bpm.  Remaining battery life to KAMRYN = 70%.  Electrode impedance status = OK.  Patient notified of interrogation results via voicemail.  Plan for patient to send a remote transmission in 3 months as scheduled.    Remote ICD transmission

## 2018-07-09 ENCOUNTER — ALLIED HEALTH/NURSE VISIT (OUTPATIENT)
Dept: CARDIOLOGY | Facility: CLINIC | Age: 48
End: 2018-07-09
Attending: INTERNAL MEDICINE
Payer: COMMERCIAL

## 2018-07-09 DIAGNOSIS — I42.1 HOCM (HYPERTROPHIC OBSTRUCTIVE CARDIOMYOPATHY) (H): Primary | ICD-10-CM

## 2018-07-09 PROCEDURE — 93295 DEV INTERROG REMOTE 1/2/MLT: CPT | Performed by: INTERNAL MEDICINE

## 2018-07-09 PROCEDURE — 93296 REM INTERROG EVL PM/IDS: CPT | Mod: ZF

## 2018-07-09 NOTE — MR AVS SNAPSHOT
After Visit Summary   7/9/2018    Lauren Lewis    MRN: [id number]           Patient Information     Date Of Birth          1970        Visit Information        Provider Department      7/9/2018 6:00 AM [employer] REMOTE [facility]        Today's Diagnoses     HOCM (hypertrophic obstructive cardiomyopathy) (H)    -  1       Follow-ups after your visit        Who to contact     If you have questions or need follow up information about today's clinic visit or your schedule please contact [facility] directly at [phone].  Normal or non-critical lab and imaging results will be communicated to you by Blykhart, letter or phone within 4 business days after the clinic has received the results. If you do not hear from us within 7 days, please contact the clinic through Dynadmict or phone. If you have a critical or abnormal lab result, we will notify you by phone as soon as possible.  Submit refill requests through [employer] or call your pharmacy and they will forward the refill request to us. Please allow 3 business days for your refill to be completed.          Additional Information About Your Visit        MyChart Information     [employer] gives you secure access to your electronic health record. If you see a primary care provider, you can also send messages to your care team and make appointments. If you have questions, please call your primary care clinic.  If you do not have a primary care provider, please call [phone] and they will assist you.        Care EveryWhere ID     This is your Care EveryWhere ID. This could be used by other organizations to access your [place] medical records  [id number]         Blood Pressure from Last 3 Encounters:   12/12/17 104/73   12/13/16 128/85   12/08/15 118/80    Weight from Last 3 Encounters:   12/12/17 81.6 kg (179 lb 14.4 oz)   12/13/16 77.6 kg (171 lb)   12/08/15 76.3 kg (168 lb 3.2 oz)              We Performed the Following      INTERROGATION DEVICE EVAL REMOTE, PACER/ICD        Primary Care Provider Office Phone # Fax #    Amanda Chu Italo Francisco [phone]       [address] [place] [address] [address] [place] [address]        Equal Access to Services     GIGIRACHEL CAITLIN : Hadii aad ku hadarceliao Soomaali, waaxda luqadaha, qaybta kaalmada adeegyada, waxay idiin hayaan adeguanakito khalicia dominick travis. So [facility] [phone].    ATENCIÓN: Si habla español, tiene a huitron disposición servicios gratuitos de asistencia lingüística. Llame al [phone].    We comply with applicable federal civil rights laws and [place] laws. We do not discriminate on the basis of race, color, national origin, age, disability, sex, sexual orientation, or gender identity.            Thank you!     Thank you for choosing [facility]  for your care. Our goal is always to provide you with excellent care. Hearing back from our patients is one way we can continue to improve our services. Please take a few minutes to complete the written survey that you may receive in the mail after your visit with us. Thank you!             Your Updated Medication List - Protect others around you: Learn how to safely use, store and throw away your medicines at www.disposemymeds.org.          This list is accurate as of 7/9/18 11:59 PM.  Always use your most recent med list.                   Brand Name Dispense Instructions for use Diagnosis    metoprolol succinate 100 MG 24 hr tablet    TOPROL-XL    90 tablet    Take 1 tablet (100 mg) by mouth daily    Hypertrophic cardiomyopathy (H)       Multi-vitamin Tabs tablet      Take 1 tablet by mouth daily        OMEPRAZOLE           PAXIL 30 MG tablet   Generic drug:  PARoxetine      Take 1 tablet by mouth every morning        RECLIPSEN 0.15-30 MG-MCG per tablet   Generic drug:  desogestrel-ethinyl estradiol      Take 1 tablet by mouth daily As directed        simvastatin 20 MG tablet    ZOCOR     Take 20 mg by mouth        SYNTHROID 100  MCG tablet   Generic drug:  levothyroxine      Take 1 tablet by mouth daily        VITAMIN D (CHOLECALCIFEROL) PO      Take 1 capsule by mouth daily

## 2018-07-12 NOTE — PROGRESS NOTES
Preliminary Device Interrogation Results.  Final physician signed paceart report to be scanned and attached.    Remote ICD transmission received and reviewed.  Device transmission sent per MD orders.  Patient has a [employer] SQ ICD.  Normal ICD function.  No episodes recorded.  Presenting EGM = NSR @ 79 bpm.  Remaining battery life to KAMRYN = 67%.  Electrode impedance status = OK.  Patient notified of interrogation results.  Patient reports that she is feeling well and denies complaints.  Plan for patient to send a remote transmission in 3 months as scheduled.    Remote ICD transmission

## 2018-08-04 ENCOUNTER — HEALTH MAINTENANCE LETTER (OUTPATIENT)
Age: 48
End: 2018-08-04

## 2018-10-30 ENCOUNTER — ALLIED HEALTH/NURSE VISIT (OUTPATIENT)
Dept: CARDIOLOGY | Facility: CLINIC | Age: 48
End: 2018-10-30
Attending: INTERNAL MEDICINE
Payer: COMMERCIAL

## 2018-10-30 DIAGNOSIS — I42.1 HOCM (HYPERTROPHIC OBSTRUCTIVE CARDIOMYOPATHY) (H): Primary | ICD-10-CM

## 2018-10-30 PROCEDURE — 93295 DEV INTERROG REMOTE 1/2/MLT: CPT | Performed by: INTERNAL MEDICINE

## 2018-10-30 PROCEDURE — 93296 REM INTERROG EVL PM/IDS: CPT | Mod: ZF

## 2018-10-30 NOTE — MR AVS SNAPSHOT
After Visit Summary   10/30/2018    Lauren Lewis    MRN: [id number]           Patient Information     Date Of Birth          1970        Visit Information        Provider Department      10/30/2018 6:00 AM [facility]        Today's Diagnoses     HOCM (hypertrophic obstructive cardiomyopathy) (H)    -  1       Follow-ups after your visit        Your next 10 appointments already scheduled     Nov 19, 2018   Procedure with Marina Morris MD   [facility] ([facility])    [address]  [place] [address] [phone]           [facility] is located at [address] [place]              Who to contact     If you have questions or need follow up information about today's clinic visit or your schedule please contact [facility] directly at [phone].  Normal or non-critical lab and imaging results will be communicated to you by MyChart, letter or phone within 4 business days after the clinic has received the results. If you do not hear from us within 7 days, please contact the clinic through [employer] or phone. If you have a critical or abnormal lab result, we will notify you by phone as soon as possible.  Submit refill requests through [employer] or call your pharmacy and they will forward the refill request to us. Please allow 3 business days for your refill to be completed.          Additional Information About Your Visit        MyChart Information     [employer] gives you secure access to your electronic health record. If you see a primary care provider, you can also send messages to your care team and make appointments. If you have questions, please call your primary care clinic.  If you do not have a primary care provider, please call [phone] and they will assist you.        Care EveryWhere ID     This is your Care EveryWhere ID. This could be used by other organizations to access your [place]  medical records  [id number]         Blood Pressure from Last 3 Encounters:   12/12/17 104/73   12/13/16 128/85   12/08/15 118/80    Weight from Last 3 Encounters:   12/12/17 81.6 kg (179 lb 14.4 oz)   12/13/16 77.6 kg (171 lb)   12/08/15 76.3 kg (168 lb 3.2 oz)              We Performed the Following     INTERROGATION DEVICE EVAL REMOTE, PACER/ICD        Primary Care Provider Office Phone # Fax #    Amanda Kimberley Francisco [phone] [address] [facility] [address]        Equal Access to Services     RACHEL TUCKER : Hadii ayla poloo Andria, waaxda luqadaha, qaybta kaalmada adeguanakitoyadelmi, aimee tan . So [facility] [phone].    ATENCIÓN: Si habla español, tiene a huitron disposición servicios gratuitos de asistencia lingüística. Llame al [phone].    We comply with applicable federal civil rights laws and [place] laws. We do not discriminate on the basis of race, color, national origin, age, disability, sex, sexual orientation, or gender identity.            Thank you!     Thank you for choosing [facility]  for your care. Our goal is always to provide you with excellent care. Hearing back from our patients is one way we can continue to improve our services. Please take a few minutes to complete the written survey that you may receive in the mail after your visit with us. Thank you!             Your Updated Medication List - Protect others around you: Learn how to safely use, store and throw away your medicines at www.disposemymeds.org.          This list is accurate as of 10/30/18 11:59 PM.  Always use your most recent med list.                   Brand Name Dispense Instructions for use Diagnosis    metoprolol succinate 100 MG 24 hr tablet    TOPROL-XL    90 tablet    Take 1 tablet (100 mg) by mouth daily    Hypertrophic cardiomyopathy (H)       Multi-vitamin Tabs tablet      Take 1 tablet by mouth daily        OMEPRAZOLE           PAXIL 30  MG tablet   Generic drug:  PARoxetine      Take 1 tablet by mouth every morning        RECLIPSEN 0.15-30 MG-MCG per tablet   Generic drug:  desogestrel-ethinyl estradiol      Take 1 tablet by mouth daily As directed        simvastatin 20 MG tablet    ZOCOR     Take 20 mg by mouth        SYNTHROID 100 MCG tablet   Generic drug:  levothyroxine      Take 1 tablet by mouth daily        VITAMIN D (CHOLECALCIFEROL) PO      Take 1 capsule by mouth daily

## 2018-10-31 NOTE — PROGRESS NOTES
Preliminary Device Interrogation Results.  Final physician signed paceart report to be scanned and attached.    Remote ICD transmission received and reviewed.  Device transmission sent per MD orders.  Patient has a [employer] SQ ICD.  Normal ICD function.  No episodes recorded.  Presenting EGM = NSR @ 94 bpm. Remaining battery life to KAMRYN = 63%.  Electrode impedance status = OK.  Patient notified of interrogation results.  Patient reports that she is feeling well and denies complaints.  Plan for patient to send a remote transmission in 3 months as scheduled.    Remote ICD transmission

## 2018-11-19 ENCOUNTER — HOSPITAL ENCOUNTER (OUTPATIENT)
Facility: CLINIC | Age: 48
Discharge: HOME | End: 2018-11-19
Attending: INTERNAL MEDICINE | Admitting: INTERNAL MEDICINE
Payer: COMMERCIAL

## 2018-11-19 ENCOUNTER — TRANSFERRED RECORDS (OUTPATIENT)
Dept: HEALTH INFORMATION MANAGEMENT | Facility: CLINIC | Age: 48
End: 2018-11-19

## 2018-11-19 ENCOUNTER — ANESTHESIA EVENT (OUTPATIENT)
Dept: GASTROENTEROLOGY | Facility: CLINIC | Age: 48
End: 2018-11-19
Payer: COMMERCIAL

## 2018-11-19 ENCOUNTER — ANESTHESIA (OUTPATIENT)
Dept: GASTROENTEROLOGY | Facility: CLINIC | Age: 48
End: 2018-11-19
Payer: COMMERCIAL

## 2018-11-19 ENCOUNTER — SURGERY (OUTPATIENT)
Age: 48
End: 2018-11-19

## 2018-11-19 VITALS
SYSTOLIC BLOOD PRESSURE: 109 MMHG | WEIGHT: 175 LBS | BODY MASS INDEX: 29.16 KG/M2 | OXYGEN SATURATION: 100 % | HEIGHT: 65 IN | DIASTOLIC BLOOD PRESSURE: 69 MMHG | RESPIRATION RATE: 15 BRPM

## 2018-11-19 LAB — COLONOSCOPY: NORMAL

## 2018-11-19 PROCEDURE — 45380 COLONOSCOPY AND BIOPSY: CPT | Mod: PT | Performed by: INTERNAL MEDICINE

## 2018-11-19 PROCEDURE — 37000009 ZZH ANESTHESIA TECHNICAL FEE, EACH ADDTL 15 MIN: Performed by: INTERNAL MEDICINE

## 2018-11-19 PROCEDURE — 25000125 ZZHC RX 250: Performed by: NURSE ANESTHETIST, CERTIFIED REGISTERED

## 2018-11-19 PROCEDURE — 88305 TISSUE EXAM BY PATHOLOGIST: CPT | Performed by: INTERNAL MEDICINE

## 2018-11-19 PROCEDURE — 25000128 H RX IP 250 OP 636: Performed by: NURSE ANESTHETIST, CERTIFIED REGISTERED

## 2018-11-19 PROCEDURE — 37000008 ZZH ANESTHESIA TECHNICAL FEE, 1ST 30 MIN: Performed by: INTERNAL MEDICINE

## 2018-11-19 PROCEDURE — 88305 TISSUE EXAM BY PATHOLOGIST: CPT | Mod: 26 | Performed by: INTERNAL MEDICINE

## 2018-11-19 PROCEDURE — 40000010 ZZH STATISTIC ANES STAT CODE-CRNA PER MINUTE: Performed by: INTERNAL MEDICINE

## 2018-11-19 RX ORDER — PROPOFOL 10 MG/ML
INJECTION, EMULSION INTRAVENOUS CONTINUOUS PRN
Status: DISCONTINUED | OUTPATIENT
Start: 2018-11-19 | End: 2018-11-19

## 2018-11-19 RX ORDER — SODIUM CHLORIDE, SODIUM LACTATE, POTASSIUM CHLORIDE, CALCIUM CHLORIDE 600; 310; 30; 20 MG/100ML; MG/100ML; MG/100ML; MG/100ML
INJECTION, SOLUTION INTRAVENOUS CONTINUOUS PRN
Status: DISCONTINUED | OUTPATIENT
Start: 2018-11-19 | End: 2018-11-19

## 2018-11-19 RX ORDER — LIDOCAINE HYDROCHLORIDE 20 MG/ML
INJECTION, SOLUTION INFILTRATION; PERINEURAL PRN
Status: DISCONTINUED | OUTPATIENT
Start: 2018-11-19 | End: 2018-11-19

## 2018-11-19 RX ADMIN — LIDOCAINE HYDROCHLORIDE 80 MG: 20 INJECTION, SOLUTION INFILTRATION; PERINEURAL at 10:05

## 2018-11-19 RX ADMIN — SODIUM CHLORIDE, POTASSIUM CHLORIDE, SODIUM LACTATE AND CALCIUM CHLORIDE: 600; 310; 30; 20 INJECTION, SOLUTION INTRAVENOUS at 10:05

## 2018-11-19 RX ADMIN — PROPOFOL 200 MCG/KG/MIN: 10 INJECTION, EMULSION INTRAVENOUS at 10:05

## 2018-11-19 ASSESSMENT — LIFESTYLE VARIABLES: TOBACCO_USE: 0

## 2018-11-19 ASSESSMENT — ENCOUNTER SYMPTOMS: SEIZURES: 0

## 2018-11-19 ASSESSMENT — COPD QUESTIONNAIRES: COPD: 0

## 2018-11-19 NOTE — ANESTHESIA PREPROCEDURE EVALUATION
Anesthesia Evaluation     . Pt has had prior anesthetic.     No history of anesthetic complications          ROS/MED HX    ENT/Pulmonary:      (-) tobacco use, asthma and COPD   Neurologic:     (+)migraines,    (-) seizures and CVA   Cardiovascular: Comment: History of HOCM s/p cardiac arrest 2015    (+) ----. : . . . :ICD . .       METS/Exercise Tolerance:     Hematologic:         Musculoskeletal:         GI/Hepatic:        (-) liver disease   Renal/Genitourinary:      (-) renal disease   Endo:     (+) thyroid problem hypothyroidism, .   (-) Type II DM   Psychiatric:         Infectious Disease:         Malignancy:         Other:                     Physical Exam  Normal systems: cardiovascular, pulmonary and dental    Airway   Mallampati: II  TM distance: >3 FB  Neck ROM: full    Dental     Cardiovascular       Pulmonary                     Anesthesia Plan      History & Physical Review  History and physical reviewed and following examination; no interval change.    ASA Status:  2 .    NPO Status:  > 8 hours    Plan for MAC with Intravenous and Propofol induction. Reason for MAC:  Deep or markedly invasive procedure (G8)  PONV prophylaxis:  Ondansetron (or other 5HT-3)  Give IV bolus prior to procedure, gentle sedation      Postoperative Care  Postoperative pain management:  IV analgesics and Multi-modal analgesia.      Consents  Anesthetic plan, risks, benefits and alternatives discussed with:  Patient..                          .

## 2018-11-19 NOTE — ANESTHESIA CARE TRANSFER NOTE
Patient: Lauren Lewis    Procedure(s):  COMBINED COLONOSCOPY, SINGLE OR MULTIPLE BIOPSY/POLYPECTOMY BY BIOPSY    Diagnosis: SCREENING COLON  Diagnosis Additional Information: No value filed.    Anesthesia Type:   MAC     Note:  Airway :Room Air  Patient transferred to:PACU  Comments: At end of procedure, spontaneous respirations, patient alert to voice, able to follow commands. Patient breathing room air to PACU. Oxygen tubing connected to wall O2 in PACU, SpO2, NiBP, and EKG monitors and alarms on and functioning, Chapo Hugger warmer connected to patient gown, report on patient's clinical status given to PACU RN, RN questions answered.Handoff Report: Identifed the Patient, Identified the Reponsible Provider, Reviewed the pertinent medical history, Discussed the surgical course, Reviewed Intra-OP anesthesia mangement and issues during anesthesia, Set expectations for post-procedure period and Allowed opportunity for questions and acknowledgement of understanding      Vitals: (Last set prior to Anesthesia Care Transfer)    CRNA VITALS  11/19/2018 1008 - 11/19/2018 1038      11/19/2018             NIBP: 114/74    Pulse: 68    NIBP Mean: 101    SpO2: 100 %    Resp Rate (set): 10                Electronically Signed By: KEITH Romero CRNA  November 19, 2018  10:38 AM

## 2018-11-19 NOTE — ANESTHESIA POSTPROCEDURE EVALUATION
Patient: Lauren Lewis    Procedure(s):  COMBINED COLONOSCOPY, SINGLE OR MULTIPLE BIOPSY/POLYPECTOMY BY BIOPSY    Diagnosis:SCREENING COLON  Diagnosis Additional Information: No value filed.    Anesthesia Type:  MAC    Note:  Anesthesia Post Evaluation    Patient location during evaluation: Phase 2  Patient participation: Able to fully participate in evaluation  Level of consciousness: awake and alert  Pain management: adequate  Airway patency: patent  Cardiovascular status: acceptable and hemodynamically stable  Respiratory status: acceptable and unassisted  Hydration status: acceptable  PONV: none             Last vitals:  Vitals:    11/19/18 0824   BP: 128/77   SpO2: 98%         Electronically Signed By: Mic Lugo DO  November 19, 2018  10:46 AM

## 2018-11-20 LAB — COPATH REPORT: NORMAL

## 2018-12-19 DIAGNOSIS — I42.2 HYPERTROPHIC CARDIOMYOPATHY (H): ICD-10-CM

## 2018-12-19 RX ORDER — METOPROLOL SUCCINATE 100 MG/1
100 TABLET, EXTENDED RELEASE ORAL DAILY
Qty: 90 TABLET | Refills: 3 | Status: SHIPPED | OUTPATIENT
Start: 2018-12-19 | End: 2019-12-26

## 2019-03-06 ENCOUNTER — ANCILLARY PROCEDURE (OUTPATIENT)
Dept: CARDIOLOGY | Facility: CLINIC | Age: 49
End: 2019-03-06
Attending: INTERNAL MEDICINE
Payer: COMMERCIAL

## 2019-03-06 DIAGNOSIS — I46.9 CARDIAC ARREST (H): ICD-10-CM

## 2019-03-06 PROCEDURE — 93296 REM INTERROG EVL PM/IDS: CPT | Mod: ZF

## 2019-03-06 PROCEDURE — 93295 DEV INTERROG REMOTE 1/2/MLT: CPT | Performed by: INTERNAL MEDICINE

## 2019-03-28 LAB
MDC_IDC_LEAD_IMPLANT_DT: NORMAL
MDC_IDC_LEAD_LOCATION: NORMAL
MDC_IDC_LEAD_LOCATION_DETAIL_1: NORMAL
MDC_IDC_LEAD_MFG: NORMAL
MDC_IDC_LEAD_MODEL: NORMAL
MDC_IDC_LEAD_POLARITY_TYPE: NORMAL
MDC_IDC_LEAD_SERIAL: NORMAL
MDC_IDC_LEAD_SPECIAL_FUNCTION: NORMAL
MDC_IDC_MSMT_BATTERY_DTM: NORMAL
MDC_IDC_MSMT_BATTERY_REMAINING_PERCENTAGE: 59 %
MDC_IDC_MSMT_BATTERY_STATUS: NORMAL
MDC_IDC_PG_IMPLANT_DTM: NORMAL
MDC_IDC_PG_MFG: NORMAL
MDC_IDC_PG_MODEL: NORMAL
MDC_IDC_PG_SERIAL: NORMAL
MDC_IDC_PG_TYPE: NORMAL
MDC_IDC_SESS_CLINIC_NAME: NORMAL
MDC_IDC_SESS_DTM: NORMAL
MDC_IDC_SESS_TYPE: NORMAL
MDC_IDC_SET_ZONE_DETECTION_INTERVAL: 240 MS
MDC_IDC_SET_ZONE_DETECTION_INTERVAL: 273 MS
MDC_IDC_SET_ZONE_TYPE: NORMAL
MDC_IDC_SET_ZONE_TYPE: NORMAL
MDC_IDC_SET_ZONE_VENDOR_TYPE: NORMAL
MDC_IDC_SET_ZONE_VENDOR_TYPE: NORMAL
MDC_IDC_STAT_EPISODE_RECENT_COUNT: 0
MDC_IDC_STAT_EPISODE_RECENT_COUNT_DTM_END: NORMAL
MDC_IDC_STAT_EPISODE_RECENT_COUNT_DTM_START: NORMAL
MDC_IDC_STAT_EPISODE_TOTAL_COUNT: 0
MDC_IDC_STAT_EPISODE_TOTAL_COUNT: 0
MDC_IDC_STAT_EPISODE_TOTAL_COUNT_DTM_END: NORMAL
MDC_IDC_STAT_EPISODE_TOTAL_COUNT_DTM_END: NORMAL
MDC_IDC_STAT_EPISODE_TOTAL_COUNT_DTM_START: NORMAL
MDC_IDC_STAT_EPISODE_TOTAL_COUNT_DTM_START: NORMAL
MDC_IDC_STAT_EPISODE_TYPE: NORMAL
MDC_IDC_STAT_EPISODE_VENDOR_TYPE: NORMAL
MDC_IDC_STAT_TACHYTHERAPY_RECENT_DTM_END: NORMAL
MDC_IDC_STAT_TACHYTHERAPY_RECENT_DTM_START: NORMAL
MDC_IDC_STAT_TACHYTHERAPY_SHOCKS_DELIVERED_RECENT: 0
MDC_IDC_STAT_TACHYTHERAPY_SHOCKS_DELIVERED_TOTAL: 1
MDC_IDC_STAT_TACHYTHERAPY_TOTAL_DTM_END: NORMAL
MDC_IDC_STAT_TACHYTHERAPY_TOTAL_DTM_START: NORMAL

## 2019-06-10 ENCOUNTER — ANCILLARY PROCEDURE (OUTPATIENT)
Dept: CARDIOLOGY | Facility: CLINIC | Age: 49
End: 2019-06-10
Attending: INTERNAL MEDICINE
Payer: COMMERCIAL

## 2019-06-10 DIAGNOSIS — I46.9 CARDIAC ARREST (H): ICD-10-CM

## 2019-06-10 PROCEDURE — 93295 DEV INTERROG REMOTE 1/2/MLT: CPT | Performed by: INTERNAL MEDICINE

## 2019-06-10 PROCEDURE — 93296 REM INTERROG EVL PM/IDS: CPT | Mod: ZF

## 2019-06-28 LAB
MDC_IDC_LEAD_IMPLANT_DT: NORMAL
MDC_IDC_LEAD_LOCATION: NORMAL
MDC_IDC_LEAD_LOCATION_DETAIL_1: NORMAL
MDC_IDC_LEAD_MFG: NORMAL
MDC_IDC_LEAD_MODEL: NORMAL
MDC_IDC_LEAD_POLARITY_TYPE: NORMAL
MDC_IDC_LEAD_SERIAL: NORMAL
MDC_IDC_LEAD_SPECIAL_FUNCTION: NORMAL
MDC_IDC_MSMT_BATTERY_DTM: NORMAL
MDC_IDC_MSMT_BATTERY_REMAINING_PERCENTAGE: 56 %
MDC_IDC_MSMT_BATTERY_STATUS: NORMAL
MDC_IDC_PG_IMPLANT_DTM: NORMAL
MDC_IDC_PG_MFG: NORMAL
MDC_IDC_PG_MODEL: NORMAL
MDC_IDC_PG_SERIAL: NORMAL
MDC_IDC_PG_TYPE: NORMAL
MDC_IDC_SESS_CLINIC_NAME: NORMAL
MDC_IDC_SESS_DTM: NORMAL
MDC_IDC_SESS_TYPE: NORMAL
MDC_IDC_SET_ZONE_DETECTION_INTERVAL: 240 MS
MDC_IDC_SET_ZONE_DETECTION_INTERVAL: 273 MS
MDC_IDC_SET_ZONE_TYPE: NORMAL
MDC_IDC_SET_ZONE_TYPE: NORMAL
MDC_IDC_SET_ZONE_VENDOR_TYPE: NORMAL
MDC_IDC_SET_ZONE_VENDOR_TYPE: NORMAL
MDC_IDC_STAT_EPISODE_RECENT_COUNT: 0
MDC_IDC_STAT_EPISODE_RECENT_COUNT_DTM_END: NORMAL
MDC_IDC_STAT_EPISODE_RECENT_COUNT_DTM_START: NORMAL
MDC_IDC_STAT_EPISODE_TOTAL_COUNT: 0
MDC_IDC_STAT_EPISODE_TOTAL_COUNT: 0
MDC_IDC_STAT_EPISODE_TOTAL_COUNT_DTM_END: NORMAL
MDC_IDC_STAT_EPISODE_TOTAL_COUNT_DTM_END: NORMAL
MDC_IDC_STAT_EPISODE_TOTAL_COUNT_DTM_START: NORMAL
MDC_IDC_STAT_EPISODE_TOTAL_COUNT_DTM_START: NORMAL
MDC_IDC_STAT_EPISODE_TYPE: NORMAL
MDC_IDC_STAT_EPISODE_VENDOR_TYPE: NORMAL
MDC_IDC_STAT_TACHYTHERAPY_RECENT_DTM_END: NORMAL
MDC_IDC_STAT_TACHYTHERAPY_RECENT_DTM_START: NORMAL
MDC_IDC_STAT_TACHYTHERAPY_SHOCKS_DELIVERED_RECENT: 0
MDC_IDC_STAT_TACHYTHERAPY_SHOCKS_DELIVERED_TOTAL: 1
MDC_IDC_STAT_TACHYTHERAPY_TOTAL_DTM_END: NORMAL
MDC_IDC_STAT_TACHYTHERAPY_TOTAL_DTM_START: NORMAL

## 2019-09-13 ENCOUNTER — ANCILLARY PROCEDURE (OUTPATIENT)
Dept: CARDIOLOGY | Facility: CLINIC | Age: 49
End: 2019-09-13
Attending: INTERNAL MEDICINE
Payer: COMMERCIAL

## 2019-09-13 DIAGNOSIS — I46.9 CARDIAC ARREST (H): ICD-10-CM

## 2019-09-13 PROCEDURE — 93296 REM INTERROG EVL PM/IDS: CPT | Mod: ZF

## 2019-09-13 PROCEDURE — 93295 DEV INTERROG REMOTE 1/2/MLT: CPT | Mod: ZP | Performed by: INTERNAL MEDICINE

## 2019-09-19 ENCOUNTER — TELEPHONE (OUTPATIENT)
Dept: CARDIOLOGY | Facility: CLINIC | Age: 49
End: 2019-09-19

## 2019-09-19 DIAGNOSIS — I42.2 HYPERTROPHIC CARDIOMYOPATHY (H): ICD-10-CM

## 2019-09-19 DIAGNOSIS — I46.9 CARDIAC ARREST (H): Primary | ICD-10-CM

## 2019-09-19 DIAGNOSIS — I42.1 HOCM (HYPERTROPHIC OBSTRUCTIVE CARDIOMYOPATHY) (H): ICD-10-CM

## 2019-09-19 LAB
MDC_IDC_LEAD_IMPLANT_DT: NORMAL
MDC_IDC_LEAD_LOCATION: NORMAL
MDC_IDC_LEAD_LOCATION_DETAIL_1: NORMAL
MDC_IDC_LEAD_MFG: NORMAL
MDC_IDC_LEAD_MODEL: NORMAL
MDC_IDC_LEAD_POLARITY_TYPE: NORMAL
MDC_IDC_LEAD_SERIAL: NORMAL
MDC_IDC_LEAD_SPECIAL_FUNCTION: NORMAL
MDC_IDC_MSMT_BATTERY_DTM: NORMAL
MDC_IDC_MSMT_BATTERY_REMAINING_PERCENTAGE: 53 %
MDC_IDC_MSMT_BATTERY_STATUS: NORMAL
MDC_IDC_PG_IMPLANT_DTM: NORMAL
MDC_IDC_PG_MFG: NORMAL
MDC_IDC_PG_MODEL: NORMAL
MDC_IDC_PG_SERIAL: NORMAL
MDC_IDC_PG_TYPE: NORMAL
MDC_IDC_SESS_CLINIC_NAME: NORMAL
MDC_IDC_SESS_DTM: NORMAL
MDC_IDC_SESS_TYPE: NORMAL
MDC_IDC_SET_ZONE_DETECTION_INTERVAL: 240 MS
MDC_IDC_SET_ZONE_DETECTION_INTERVAL: 273 MS
MDC_IDC_SET_ZONE_TYPE: NORMAL
MDC_IDC_SET_ZONE_TYPE: NORMAL
MDC_IDC_SET_ZONE_VENDOR_TYPE: NORMAL
MDC_IDC_SET_ZONE_VENDOR_TYPE: NORMAL
MDC_IDC_STAT_EPISODE_RECENT_COUNT: 0
MDC_IDC_STAT_EPISODE_RECENT_COUNT_DTM_END: NORMAL
MDC_IDC_STAT_EPISODE_RECENT_COUNT_DTM_START: NORMAL
MDC_IDC_STAT_EPISODE_TOTAL_COUNT: 0
MDC_IDC_STAT_EPISODE_TOTAL_COUNT: 0
MDC_IDC_STAT_EPISODE_TOTAL_COUNT_DTM_END: NORMAL
MDC_IDC_STAT_EPISODE_TOTAL_COUNT_DTM_END: NORMAL
MDC_IDC_STAT_EPISODE_TOTAL_COUNT_DTM_START: NORMAL
MDC_IDC_STAT_EPISODE_TOTAL_COUNT_DTM_START: NORMAL
MDC_IDC_STAT_EPISODE_TYPE: NORMAL
MDC_IDC_STAT_EPISODE_VENDOR_TYPE: NORMAL
MDC_IDC_STAT_TACHYTHERAPY_RECENT_DTM_END: NORMAL
MDC_IDC_STAT_TACHYTHERAPY_RECENT_DTM_START: NORMAL
MDC_IDC_STAT_TACHYTHERAPY_SHOCKS_DELIVERED_RECENT: 0
MDC_IDC_STAT_TACHYTHERAPY_SHOCKS_DELIVERED_TOTAL: 1
MDC_IDC_STAT_TACHYTHERAPY_TOTAL_DTM_END: NORMAL
MDC_IDC_STAT_TACHYTHERAPY_TOTAL_DTM_START: NORMAL

## 2019-10-03 ENCOUNTER — HEALTH MAINTENANCE LETTER (OUTPATIENT)
Age: 49
End: 2019-10-03

## 2019-10-08 ENCOUNTER — TRANSFERRED RECORDS (OUTPATIENT)
Dept: HEALTH INFORMATION MANAGEMENT | Facility: CLINIC | Age: 49
End: 2019-10-08

## 2019-10-09 ENCOUNTER — DOCUMENTATION ONLY (OUTPATIENT)
Dept: CARE COORDINATION | Facility: CLINIC | Age: 49
End: 2019-10-09

## 2019-12-03 DIAGNOSIS — I42.1 HOCM (HYPERTROPHIC OBSTRUCTIVE CARDIOMYOPATHY) (H): ICD-10-CM

## 2019-12-03 DIAGNOSIS — I42.2 HYPERTROPHIC CARDIOMYOPATHY (H): Primary | ICD-10-CM

## 2019-12-04 ENCOUNTER — CARE COORDINATION (OUTPATIENT)
Dept: CARDIOLOGY | Facility: CLINIC | Age: 49
End: 2019-12-04

## 2019-12-04 NOTE — PROGRESS NOTES
Called patient to confirm appointments, including lab appointment at 9am. Left VM and provided callback information.

## 2019-12-05 NOTE — PROGRESS NOTES
Adult congenital cardiology clinic    HPI:  49-year-old female with a history of hypertrophic cardiomyopathy with  MYBPC3 mutation, history of V-fib arrest s/p subcutaneous  ICD presents for ongoing evaluation and management.      At the time of her arrest [facility] with apical hypokinesis to akinesis and a transmural apical scar. Her last device check from December showed no episodes. She had another check today shows no episodes. Echo today per my review shows stable bi ventricular function without significant valvular abnormalities.     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  Fortunately, her children, who are aged 15 and 12, did not have this gene mutation.    SOCIAL HISTORY:  Social History     Social History     Marital Status:      Spouse Name: N/A     Number of Children: N/A     Years of Education: N/A     Social History Main Topics     Smoking status: Never Smoker      Smokeless tobacco: None     Alcohol Use: No     Drug Use: No     Sexual Activity:     Partners: Male     Other Topics  "Concern     None     Social History Narrative       CURRENT MEDICATIONS:  Current Outpatient Medications   Medication Sig Dispense Refill     desogestrel-ethinyl estradiol (RECLIPSEN) 0.15-30 MG-MCG per tablet Take 1 tablet by mouth daily As directed       levothyroxine (SYNTHROID) 100 MCG tablet Take 1 tablet by mouth daily       metoprolol succinate ER (TOPROL-XL) 100 MG 24 hr tablet Take 1 tablet (100 mg) by mouth daily 90 tablet 3     multivitamin, therapeutic with minerals (MULTI-VITAMIN) TABS Take 1 tablet by mouth daily       OMEPRAZOLE        PARoxetine (PAXIL) 30 MG tablet Take 1 tablet by mouth every morning       simvastatin (ZOCOR) 20 MG tablet Take 20 mg by mouth At Bedtime        VITAMIN D, CHOLECALCIFEROL, PO Take 1 capsule by mouth daily          ROS:   Constitutional: No fever, chills, or sweats. No weight gain/loss.   ENT: No visual disturbance, ear ache, epistaxis, sore throat.   Allergies/Immunologic: Negative.   Respiratory: No cough, hemoptysis.   Cardiovascular: As per HPI.   GI: No nausea, vomiting, hematemesis, melena, or hematochezia.   : No urinary frequency, dysuria, or hematuria.   Integument: Negative.   Psychiatric: Negative.   Neuro: Negative.   Endocrinology: Negative.   Musculoskeletal: Negative.    EXAM:  /80 (BP Location: Right arm, Patient Position: Chair, Cuff Size: Adult Large)   Pulse 72   Ht 1.651 m (5' 5\")   Wt 83.8 kg (184 lb 11.2 oz)   SpO2 99%   BMI 30.74 kg/m    General: appears comfortable, alert and articulate  Head: normocephalic, atraumatic  Eyes: anicteric sclera, EOMI  Neck: no adenopathy, 2+ carotids  Orophyarynx: moist mucosa, no lesions, dentition intact  Heart: regular, S1/S2, no murmur, gallop, rub, estimated JVP 7cm.  Lungs: clear, no rales or wheezing  Abdomen: soft, non-tender, bowel sounds present, no hepatomegaly  Extremities: no clubbing, cyanosis or edema  Neurological: normal speech and affect, no gross motor deficits    Labs:  CBC " RESULTS:  Lab Results   Component Value Date    WBC 8.2 12/12/2017    RBC 4.46 12/12/2017    HGB 14.2 12/12/2017    HCT 42.3 12/12/2017    MCV 95 12/12/2017    MCH 31.8 12/12/2017    MCHC 33.6 12/12/2017    RDW 11.7 12/12/2017     12/12/2017       CMP RESULTS:  Lab Results   Component Value Date     12/06/2019    POTASSIUM 4.0 12/06/2019    CHLORIDE 107 12/06/2019    CO2 26 12/06/2019    ANIONGAP 5 12/06/2019    GLC 77 12/06/2019    BUN 13 12/06/2019    CR 1.08 (H) 12/06/2019    GFRESTIMATED 59 (L) 12/06/2019    GFRESTBLACK 69 12/06/2019    ZAKIA 8.9 12/06/2019    BILITOTAL 0.4 12/12/2017    ALBUMIN 3.3 (L) 12/12/2017    ALKPHOS 59 12/12/2017    ALT 22 12/12/2017    AST 18 12/12/2017              Echo 2017  Normal left ventricular size, thickness, and function. EF 55-60%. Normal left  ventricular filling for age. No regional wall motion abnormalities are seen.  Normal right ventricular size and function.  No significant valvular pathology.  The inferior vena cava was normal in size with preserved respiratory variability. No pericardial effusion.   No change from prior study 12/13/16.      Assessment and Plan:  49-year-old lady with a history of hypertrophic cardiomyopathy with  MYBPC3 mutation, history of V-fib arrest s/p ICD presents for ongoing evaluation and management.     - hypertrophic cardiomyopathy with  MYBPC3 mutation, history of V-fib arrest s/p subcutaneous [employer] ICD:  The patient continues to feel well.     Echo remains stable as documented above- Will continue metoprolol xl 100 mg daily.   Pt aware of hypertrophic cardiomyopathy exercise restrictions.  Pt also aware of recommendation for all first degree relatives to undergo clinical screening unless they opt to pursue genetic testing and are found to be gene negative as with her children,       Follow up in 2 years with echo, ECG labs    Case seen and discussed with Dr Jackson Gilman MD  Cardiology fellow,  PGY-1      I have reviewed today's vital signs, notes, medications, labs and imaging. I have also seen and examined the patient and agree with the findings and plan as outlined above.    Belle Paz MD  Section Head - Advanced Heart Failure, Transplantation and Mechanical Circulatory Support  Director - Adult Congenital and Cardiovascular Genetics Center  Associate Professor of Medicine, [facility]

## 2019-12-06 ENCOUNTER — ANCILLARY PROCEDURE (OUTPATIENT)
Dept: CARDIOLOGY | Facility: CLINIC | Age: 49
End: 2019-12-06
Attending: INTERNAL MEDICINE
Payer: COMMERCIAL

## 2019-12-06 VITALS
BODY MASS INDEX: 30.77 KG/M2 | WEIGHT: 184.7 LBS | DIASTOLIC BLOOD PRESSURE: 80 MMHG | HEIGHT: 65 IN | OXYGEN SATURATION: 99 % | HEART RATE: 72 BPM | SYSTOLIC BLOOD PRESSURE: 122 MMHG

## 2019-12-06 DIAGNOSIS — I42.2 HYPERTROPHIC CARDIOMYOPATHY (H): ICD-10-CM

## 2019-12-06 DIAGNOSIS — I46.9 CARDIAC ARREST (H): ICD-10-CM

## 2019-12-06 DIAGNOSIS — I42.1 HOCM (HYPERTROPHIC OBSTRUCTIVE CARDIOMYOPATHY) (H): ICD-10-CM

## 2019-12-06 DIAGNOSIS — Z95.810 AICD (AUTOMATIC CARDIOVERTER/DEFIBRILLATOR) PRESENT: Primary | ICD-10-CM

## 2019-12-06 LAB
ANION GAP SERPL CALCULATED.3IONS-SCNC: 5 MMOL/L (ref 3–14)
BUN SERPL-MCNC: 13 MG/DL (ref 7–30)
CALCIUM SERPL-MCNC: 8.9 MG/DL (ref 8.5–10.1)
CHLORIDE SERPL-SCNC: 107 MMOL/L (ref 94–109)
CO2 SERPL-SCNC: 26 MMOL/L (ref 20–32)
CREAT SERPL-MCNC: 1.08 MG/DL (ref 0.52–1.04)
GFR SERPL CREATININE-BSD FRML MDRD: 59 ML/MIN/{1.73_M2}
GLUCOSE SERPL-MCNC: 77 MG/DL (ref 70–99)
MDC_IDC_LEAD_IMPLANT_DT: NORMAL
MDC_IDC_LEAD_LOCATION: NORMAL
MDC_IDC_LEAD_LOCATION_DETAIL_1: NORMAL
MDC_IDC_LEAD_MFG: NORMAL
MDC_IDC_LEAD_MODEL: NORMAL
MDC_IDC_LEAD_POLARITY_TYPE: NORMAL
MDC_IDC_LEAD_SERIAL: NORMAL
MDC_IDC_LEAD_SPECIAL_FUNCTION: NORMAL
MDC_IDC_MSMT_BATTERY_DTM: NORMAL
MDC_IDC_MSMT_BATTERY_REMAINING_PERCENTAGE: 51 %
MDC_IDC_MSMT_BATTERY_STATUS: NORMAL
MDC_IDC_MSMT_CAP_CHARGE_TYPE: NORMAL
MDC_IDC_PG_IMPLANT_DTM: NORMAL
MDC_IDC_PG_MFG: NORMAL
MDC_IDC_PG_MODEL: NORMAL
MDC_IDC_PG_SERIAL: NORMAL
MDC_IDC_PG_TYPE: NORMAL
MDC_IDC_SESS_CLINIC_NAME: NORMAL
MDC_IDC_SESS_DTM: NORMAL
MDC_IDC_SESS_TYPE: NORMAL
MDC_IDC_SET_ZONE_DETECTION_INTERVAL: 240 MS
MDC_IDC_SET_ZONE_DETECTION_INTERVAL: 272.73 MS
MDC_IDC_SET_ZONE_TYPE: NORMAL
MDC_IDC_SET_ZONE_VENDOR_TYPE: NORMAL
POTASSIUM SERPL-SCNC: 4 MMOL/L (ref 3.4–5.3)
SODIUM SERPL-SCNC: 138 MMOL/L (ref 133–144)

## 2019-12-06 PROCEDURE — 99214 OFFICE O/P EST MOD 30 MIN: CPT | Mod: GC | Performed by: INTERNAL MEDICINE

## 2019-12-06 PROCEDURE — 93005 ELECTROCARDIOGRAM TRACING: CPT | Mod: ZF

## 2019-12-06 PROCEDURE — G0463 HOSPITAL OUTPT CLINIC VISIT: HCPCS | Mod: ZF

## 2019-12-06 PROCEDURE — 80048 BASIC METABOLIC PNL TOTAL CA: CPT | Performed by: INTERNAL MEDICINE

## 2019-12-06 PROCEDURE — 36415 COLL VENOUS BLD VENIPUNCTURE: CPT | Performed by: INTERNAL MEDICINE

## 2019-12-06 RX ORDER — SPIRONOLACTONE 25 MG/1
25 TABLET ORAL DAILY
Qty: 90 TABLET | Refills: 3 | Status: SHIPPED | OUTPATIENT
Start: 2019-12-06 | End: 2020-10-15

## 2019-12-06 ASSESSMENT — PAIN SCALES - GENERAL: PAINLEVEL: NO PAIN (0)

## 2019-12-06 ASSESSMENT — MIFFLIN-ST. JEOR: SCORE: 1453.67

## 2019-12-06 NOTE — NURSING NOTE
Chief Complaint   Patient presents with     Follow Up     49 year old female with history of HCM presenting for follow up.     Vitals were taken and medications were reconciled.     JAKE Hughes  11:10 AM

## 2019-12-06 NOTE — PATIENT INSTRUCTIONS
It was a pleasure to see you in clinic today.  Please do not hesitate to call with any questions or concerns.  You are scheduled for a remote transmission on 3/6/20.  We look forward to seeing you in clinic at your next device check in 6 months.    Bam Otto, RN  Electrophysiology Nurse Clinician  [facility]    During Business Hours Please Call:  [phone]  After Hours Please Call:  [phone] - select option #4 and ask for job code [phone]

## 2019-12-06 NOTE — LETTER
12/6/2019      RE: Lauren Lewis  [address] [address]       Dear Colleague,    Thank you for the opportunity to participate in the care of your patient, Lauren Lewis, at the [facility]. Please see a copy of my visit note below.    Adult congenital cardiology clinic    HPI:  49-year-old female with a history of hypertrophic cardiomyopathy with  MYBPC3 mutation, history of V-fib arrest s/p subcutaneous  ICD presents for ongoing evaluation and management.      At the time of her arrest [facility] hypokinesis to akinesis and a transmural apical scar. Her last device check from December showed no episodes. She had another check today shows no episodes. Echo today per my review shows stable bi ventricular function without significant valvular abnormalities.     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  Fortunately, her children, who are aged 15 and 12, did not have this gene mutation.    SOCIAL  "HISTORY:  Social History     Social History     Marital Status:      Spouse Name: N/A     Number of Children: N/A     Years of Education: N/A     Social History Main Topics     Smoking status: Never Smoker      Smokeless tobacco: None     Alcohol Use: No     Drug Use: No     Sexual Activity:     Partners: Male     Other Topics Concern     None     Social History Narrative       CURRENT MEDICATIONS:  Current Outpatient Medications   Medication Sig Dispense Refill     desogestrel-ethinyl estradiol (RECLIPSEN) 0.15-30 MG-MCG per tablet Take 1 tablet by mouth daily As directed       levothyroxine (SYNTHROID) 100 MCG tablet Take 1 tablet by mouth daily       metoprolol succinate ER (TOPROL-XL) 100 MG 24 hr tablet Take 1 tablet (100 mg) by mouth daily 90 tablet 3     multivitamin, therapeutic with minerals (MULTI-VITAMIN) TABS Take 1 tablet by mouth daily       OMEPRAZOLE        PARoxetine (PAXIL) 30 MG tablet Take 1 tablet by mouth every morning       simvastatin (ZOCOR) 20 MG tablet Take 20 mg by mouth At Bedtime        VITAMIN D, CHOLECALCIFEROL, PO Take 1 capsule by mouth daily          ROS:   Constitutional: No fever, chills, or sweats. No weight gain/loss.   ENT: No visual disturbance, ear ache, epistaxis, sore throat.   Allergies/Immunologic: Negative.   Respiratory: No cough, hemoptysis.   Cardiovascular: As per HPI.   GI: No nausea, vomiting, hematemesis, melena, or hematochezia.   : No urinary frequency, dysuria, or hematuria.   Integument: Negative.   Psychiatric: Negative.   Neuro: Negative.   Endocrinology: Negative.   Musculoskeletal: Negative.    EXAM:  /80 (BP Location: Right arm, Patient Position: Chair, Cuff Size: Adult Large)   Pulse 72   Ht 1.651 m (5' 5\")   Wt 83.8 kg (184 lb 11.2 oz)   SpO2 99%   BMI 30.74 kg/m     General: appears comfortable, alert and articulate  Head: normocephalic, atraumatic  Eyes: anicteric sclera, EOMI  Neck: no adenopathy, 2+ carotids  Orophyarynx: moist " mucosa, no lesions, dentition intact  Heart: regular, S1/S2, no murmur, gallop, rub, estimated JVP 7cm.  Lungs: clear, no rales or wheezing  Abdomen: soft, non-tender, bowel sounds present, no hepatomegaly  Extremities: no clubbing, cyanosis or edema  Neurological: normal speech and affect, no gross motor deficits    Labs:  CBC RESULTS:  Lab Results   Component Value Date    WBC 8.2 12/12/2017    RBC 4.46 12/12/2017    HGB 14.2 12/12/2017    HCT 42.3 12/12/2017    MCV 95 12/12/2017    MCH 31.8 12/12/2017    MCHC 33.6 12/12/2017    RDW 11.7 12/12/2017     12/12/2017       CMP RESULTS:  Lab Results   Component Value Date     12/06/2019    POTASSIUM 4.0 12/06/2019    CHLORIDE 107 12/06/2019    CO2 26 12/06/2019    ANIONGAP 5 12/06/2019    GLC 77 12/06/2019    BUN 13 12/06/2019    CR 1.08 (H) 12/06/2019    GFRESTIMATED 59 (L) 12/06/2019    GFRESTBLACK 69 12/06/2019    ZAKIA 8.9 12/06/2019    BILITOTAL 0.4 12/12/2017    ALBUMIN 3.3 (L) 12/12/2017    ALKPHOS 59 12/12/2017    ALT 22 12/12/2017    AST 18 12/12/2017              Echo 2017  Normal left ventricular size, thickness, and function. EF 55-60%. Normal left  ventricular filling for age. No regional wall motion abnormalities are seen.  Normal right ventricular size and function.  No significant valvular pathology.  The inferior vena cava was normal in size with preserved respiratory variability. No pericardial effusion.   No change from prior study 12/13/16.      Assessment and Plan:  49-year-old lady with a history of hypertrophic cardiomyopathy with  MYBPC3 mutation, history of V-fib arrest s/p ICD presents for ongoing evaluation and management.     - hypertrophic cardiomyopathy with  MYBPC3 mutation, history of V-fib arrest s/p subcutaneous [employer] ICD:  The patient continues to feel well.     Echo remains stable as documented above- Will continue metoprolol xl 100 mg daily.   Pt aware of hypertrophic cardiomyopathy exercise restrictions.  Pt  also aware of recommendation for all first degree relatives to undergo clinical screening unless they opt to pursue genetic testing and are found to be gene negative as with her children,       Follow up in 2 years with echo, ECG labs    Case seen and discussed with Dr Jackson Gilman MD  Cardiology fellow, PGY-1      I have reviewed today's vital signs, notes, medications, labs and imaging. I have also seen and examined the patient and agree with the findings and plan as outlined above.    Belle Paz MD  Section Head - Advanced Heart Failure, Transplantation and Mechanical Circulatory Support  Director - Adult Congenital and Cardiovascular Genetics Center  Associate Professor of Medicine, [facility]

## 2019-12-06 NOTE — NURSING NOTE
Cardiac Testing: Patient given instructions regarding  echocardiogram . Discussed purpose, preparation, procedure and when to expect results reported back to the patient. Patient demonstrated understanding of this information and agreed to call with further questions or concerns.  Diet: Patient instructed regarding a heart healthy diet, including discussion of reduced fat and sodium intake. Patient demonstrated understanding of this information and agreed to call with further questions or concerns.  Labs: Patient was given results of the laboratory testing obtained today. Patient was instructed to return for the next laboratory testing in two years. Patient demonstrated understanding of this information and agreed to call with further questions or concerns.   Med Reconcile: Reviewed and verified all current medications with the patient. The updated medication list was printed and given to the patient.  New Medication: Patient was educated regarding newly prescribed medication, including discussion of  the indication, administration, side effects, and when to report to MD or RN. Patient demonstrated understanding of this information and agreed to call with further questions or concerns.  Return Appointment: Patient given instructions regarding scheduling next clinic visit. Patient demonstrated understanding of this information and agreed to call with further questions or concerns.  Patient stated she understood all health information given and agreed to call with further questions or concerns.

## 2019-12-06 NOTE — PATIENT INSTRUCTIONS
You were seen today in the Adult Congenital and Cardiovascular Genetics Clinic at the [facility].    Cardiology Providers you saw during your visit:  Belle Paz MD    Diagnosis:  HCM    Results:  Belle Paz MD reviewed the results of your labs, echo, and EKG testing today in clinic.    Recommendations:    1. Continue to eat a heart healthy, low salt diet.  2. Continue to get 20-30 minutes of aerobic activity, 4-5 days per week.  Examples of aerobic activity include walking, running, swimming, cycling, etc.  3. Continue to observe good oral hygiene, with regular dental visits.  4. Begin taking spirolactone 25 mg daily in the morning.    SBE prophylaxis:   Yes____  No____    Lifelong Bacterial Endocarditis Prophylaxis:  YES____  NO____    If YES is checked, follow the recommendations outlined below:  1. Take antibiotic(s) prior to recommended dental procedures and procedures on the respiratory tract or with infected skin, muscle or bones. SBE prophylaxis is not needed for routine GI and  procedures (ie. Colonoscopy or vaginal delivery)  2. Observe good oral hygiene daily, as advised by your dentist. Get regular professional dental care.  3. Keep cuts clean.  4. Infections should be treated promptly.  5. Symptoms of Infective Endocarditis could include: fever lasting more than 4-5 days or a recurrent fever that initially resolves but returns within 1-2 days)      Exercise restrictions:   Yes__X__  No____         If yes, list restrictions:  Must be allowed to rest if fatigued or SOB, follow the HCM exercise restrictions discussed with Dr. Paz    Work restrictions:  Yes____  No_X___         If yes, list restrictions:    FASTING CHOLESTEROL was checked in the last 5 years YES_X__  NO___ (2018)  Continue to eat a heart healthy, low salt diet.         ____ Fasting lipid panel order today         ____ No changes in medications          ____ I recommend the following changes in your cholesterol  medications.:          ____ Please follow up for cholesterol screening at your primary care physician      Follow-up:  Follow up in 2 years with Dr. Paz, an echo, an EKG, and labs.    If you have questions or concerns please contact us at:    Alma Jacome, MSN, RN, CNL    Arlin Smith (Scheduling)  Nurse Care Coordinator     Clinic   Adult Congenital and CV Genetics   Adult Congenital and CV Genetic  [facility]  (P) [phone]     (P) [phone]  [email or web address]   (F) [phone]        For after hours urgent needs, call [phone] and ask to speak to the Adult Congenital Physician on call.  Mention Job Code [id number].    For emergencies call 911.    [facility]  Mail Code [id number]  [address] [place]  [address]

## 2019-12-23 DIAGNOSIS — I42.2 HYPERTROPHIC CARDIOMYOPATHY (H): Primary | ICD-10-CM

## 2019-12-26 RX ORDER — METOPROLOL SUCCINATE 100 MG/1
100 TABLET, EXTENDED RELEASE ORAL DAILY
Qty: 90 TABLET | Refills: 3 | Status: SHIPPED | OUTPATIENT
Start: 2019-12-26 | End: 2020-10-15

## 2020-01-21 ENCOUNTER — CARE COORDINATION (OUTPATIENT)
Dept: CARDIOLOGY | Facility: CLINIC | Age: 50
End: 2020-01-21

## 2020-01-21 NOTE — PROGRESS NOTES
Called patient to follow up on recent medication addition of spironolactone and follow up BMP. Provided callback information.

## 2020-01-24 LAB
ANION GAP SERPL CALCULATED.3IONS-SCNC: 5 MMOL/L
BUN SERPL-MCNC: 10 MG/DL
CALCIUM SERPL-MCNC: 9.1 MG/DL
CHLORIDE SERPLBLD-SCNC: 107 MMOL/L
CO2 SERPL-SCNC: 27 MMOL/L
CREAT SERPL-MCNC: 1.07 MG/DL
GFR SERPL CREATININE-BSD FRML MDRD: 54 ML/MIN/1.73M2
GLUCOSE SERPL-MCNC: 92 MG/DL (ref 70–99)
POTASSIUM SERPL-SCNC: 4.2 MMOL/L
SODIUM SERPL-SCNC: 139 MMOL/L

## 2020-02-08 ENCOUNTER — HEALTH MAINTENANCE LETTER (OUTPATIENT)
Age: 50
End: 2020-02-08

## 2020-03-02 ENCOUNTER — MYC MEDICAL ADVICE (OUTPATIENT)
Dept: CARDIOLOGY | Facility: CLINIC | Age: 50
End: 2020-03-02

## 2020-03-06 ENCOUNTER — ANCILLARY PROCEDURE (OUTPATIENT)
Dept: CARDIOLOGY | Facility: CLINIC | Age: 50
End: 2020-03-06
Attending: INTERNAL MEDICINE
Payer: COMMERCIAL

## 2020-03-06 DIAGNOSIS — I46.9 CARDIAC ARREST (H): ICD-10-CM

## 2020-03-06 PROCEDURE — 93295 DEV INTERROG REMOTE 1/2/MLT: CPT | Mod: 26 | Performed by: INTERNAL MEDICINE

## 2020-03-06 PROCEDURE — 93296 REM INTERROG EVL PM/IDS: CPT | Mod: ZF

## 2020-03-17 LAB
MDC_IDC_LEAD_IMPLANT_DT: NORMAL
MDC_IDC_LEAD_LOCATION: NORMAL
MDC_IDC_LEAD_LOCATION_DETAIL_1: NORMAL
MDC_IDC_LEAD_MFG: NORMAL
MDC_IDC_LEAD_MODEL: NORMAL
MDC_IDC_LEAD_POLARITY_TYPE: NORMAL
MDC_IDC_LEAD_SERIAL: NORMAL
MDC_IDC_LEAD_SPECIAL_FUNCTION: NORMAL
MDC_IDC_MSMT_BATTERY_DTM: NORMAL
MDC_IDC_MSMT_BATTERY_REMAINING_PERCENTAGE: 47 %
MDC_IDC_MSMT_BATTERY_STATUS: NORMAL
MDC_IDC_PG_IMPLANT_DTM: NORMAL
MDC_IDC_PG_MFG: NORMAL
MDC_IDC_PG_MODEL: NORMAL
MDC_IDC_PG_SERIAL: NORMAL
MDC_IDC_PG_TYPE: NORMAL
MDC_IDC_SESS_CLINIC_NAME: NORMAL
MDC_IDC_SESS_DTM: NORMAL
MDC_IDC_SESS_TYPE: NORMAL
MDC_IDC_SET_ZONE_DETECTION_INTERVAL: 240 MS
MDC_IDC_SET_ZONE_DETECTION_INTERVAL: 273 MS
MDC_IDC_SET_ZONE_TYPE: NORMAL
MDC_IDC_SET_ZONE_TYPE: NORMAL
MDC_IDC_SET_ZONE_VENDOR_TYPE: NORMAL
MDC_IDC_SET_ZONE_VENDOR_TYPE: NORMAL
MDC_IDC_STAT_EPISODE_RECENT_COUNT: 0
MDC_IDC_STAT_EPISODE_RECENT_COUNT_DTM_END: NORMAL
MDC_IDC_STAT_EPISODE_RECENT_COUNT_DTM_START: NORMAL
MDC_IDC_STAT_EPISODE_TOTAL_COUNT: 0
MDC_IDC_STAT_EPISODE_TOTAL_COUNT: 0
MDC_IDC_STAT_EPISODE_TOTAL_COUNT_DTM_END: NORMAL
MDC_IDC_STAT_EPISODE_TOTAL_COUNT_DTM_END: NORMAL
MDC_IDC_STAT_EPISODE_TOTAL_COUNT_DTM_START: NORMAL
MDC_IDC_STAT_EPISODE_TOTAL_COUNT_DTM_START: NORMAL
MDC_IDC_STAT_EPISODE_TYPE: NORMAL
MDC_IDC_STAT_EPISODE_VENDOR_TYPE: NORMAL
MDC_IDC_STAT_TACHYTHERAPY_RECENT_DTM_END: NORMAL
MDC_IDC_STAT_TACHYTHERAPY_RECENT_DTM_START: NORMAL
MDC_IDC_STAT_TACHYTHERAPY_SHOCKS_DELIVERED_RECENT: 0
MDC_IDC_STAT_TACHYTHERAPY_SHOCKS_DELIVERED_TOTAL: 1
MDC_IDC_STAT_TACHYTHERAPY_TOTAL_DTM_END: NORMAL
MDC_IDC_STAT_TACHYTHERAPY_TOTAL_DTM_START: NORMAL

## 2020-06-06 ENCOUNTER — ANCILLARY PROCEDURE (OUTPATIENT)
Dept: CARDIOLOGY | Facility: CLINIC | Age: 50
End: 2020-06-06
Attending: INTERNAL MEDICINE
Payer: COMMERCIAL

## 2020-06-06 DIAGNOSIS — Z95.810 AICD (AUTOMATIC CARDIOVERTER/DEFIBRILLATOR) PRESENT: ICD-10-CM

## 2020-06-06 DIAGNOSIS — I46.9 CARDIAC ARREST (H): ICD-10-CM

## 2020-06-06 PROCEDURE — 93296 REM INTERROG EVL PM/IDS: CPT | Mod: ZF

## 2020-06-06 PROCEDURE — 93295 DEV INTERROG REMOTE 1/2/MLT: CPT | Performed by: INTERNAL MEDICINE

## 2020-06-16 LAB
MDC_IDC_LEAD_IMPLANT_DT: NORMAL
MDC_IDC_LEAD_LOCATION: NORMAL
MDC_IDC_LEAD_LOCATION_DETAIL_1: NORMAL
MDC_IDC_LEAD_MFG: NORMAL
MDC_IDC_LEAD_MODEL: NORMAL
MDC_IDC_LEAD_POLARITY_TYPE: NORMAL
MDC_IDC_LEAD_SERIAL: NORMAL
MDC_IDC_LEAD_SPECIAL_FUNCTION: NORMAL
MDC_IDC_MSMT_BATTERY_DTM: NORMAL
MDC_IDC_MSMT_BATTERY_REMAINING_PERCENTAGE: 44 %
MDC_IDC_MSMT_BATTERY_STATUS: NORMAL
MDC_IDC_PG_IMPLANT_DTM: NORMAL
MDC_IDC_PG_MFG: NORMAL
MDC_IDC_PG_MODEL: NORMAL
MDC_IDC_PG_SERIAL: NORMAL
MDC_IDC_PG_TYPE: NORMAL
MDC_IDC_SESS_CLINIC_NAME: NORMAL
MDC_IDC_SESS_DTM: NORMAL
MDC_IDC_SESS_TYPE: NORMAL
MDC_IDC_SET_ZONE_DETECTION_INTERVAL: 240 MS
MDC_IDC_SET_ZONE_DETECTION_INTERVAL: 273 MS
MDC_IDC_SET_ZONE_TYPE: NORMAL
MDC_IDC_SET_ZONE_TYPE: NORMAL
MDC_IDC_SET_ZONE_VENDOR_TYPE: NORMAL
MDC_IDC_SET_ZONE_VENDOR_TYPE: NORMAL
MDC_IDC_STAT_EPISODE_RECENT_COUNT: 0
MDC_IDC_STAT_EPISODE_RECENT_COUNT_DTM_END: NORMAL
MDC_IDC_STAT_EPISODE_RECENT_COUNT_DTM_START: NORMAL
MDC_IDC_STAT_EPISODE_TOTAL_COUNT: 0
MDC_IDC_STAT_EPISODE_TOTAL_COUNT: 0
MDC_IDC_STAT_EPISODE_TOTAL_COUNT_DTM_END: NORMAL
MDC_IDC_STAT_EPISODE_TOTAL_COUNT_DTM_END: NORMAL
MDC_IDC_STAT_EPISODE_TOTAL_COUNT_DTM_START: NORMAL
MDC_IDC_STAT_EPISODE_TOTAL_COUNT_DTM_START: NORMAL
MDC_IDC_STAT_EPISODE_TYPE: NORMAL
MDC_IDC_STAT_EPISODE_VENDOR_TYPE: NORMAL
MDC_IDC_STAT_TACHYTHERAPY_RECENT_DTM_END: NORMAL
MDC_IDC_STAT_TACHYTHERAPY_RECENT_DTM_START: NORMAL
MDC_IDC_STAT_TACHYTHERAPY_SHOCKS_DELIVERED_RECENT: 0
MDC_IDC_STAT_TACHYTHERAPY_SHOCKS_DELIVERED_TOTAL: 1
MDC_IDC_STAT_TACHYTHERAPY_TOTAL_DTM_END: NORMAL
MDC_IDC_STAT_TACHYTHERAPY_TOTAL_DTM_START: NORMAL

## 2020-09-10 ENCOUNTER — ANCILLARY PROCEDURE (OUTPATIENT)
Dept: CARDIOLOGY | Facility: CLINIC | Age: 50
End: 2020-09-10
Attending: INTERNAL MEDICINE
Payer: COMMERCIAL

## 2020-09-10 DIAGNOSIS — I46.9 CARDIAC ARREST (H): ICD-10-CM

## 2020-09-10 DIAGNOSIS — Z95.810 AICD (AUTOMATIC CARDIOVERTER/DEFIBRILLATOR) PRESENT: ICD-10-CM

## 2020-09-10 PROCEDURE — 93295 DEV INTERROG REMOTE 1/2/MLT: CPT | Performed by: INTERNAL MEDICINE

## 2020-09-10 PROCEDURE — 93296 REM INTERROG EVL PM/IDS: CPT | Mod: ZF

## 2020-09-16 LAB
MDC_IDC_LEAD_IMPLANT_DT: NORMAL
MDC_IDC_LEAD_LOCATION: NORMAL
MDC_IDC_LEAD_LOCATION_DETAIL_1: NORMAL
MDC_IDC_LEAD_MFG: NORMAL
MDC_IDC_LEAD_MODEL: NORMAL
MDC_IDC_LEAD_POLARITY_TYPE: NORMAL
MDC_IDC_LEAD_SERIAL: NORMAL
MDC_IDC_LEAD_SPECIAL_FUNCTION: NORMAL
MDC_IDC_MSMT_BATTERY_DTM: NORMAL
MDC_IDC_MSMT_BATTERY_REMAINING_PERCENTAGE: 41 %
MDC_IDC_MSMT_BATTERY_STATUS: NORMAL
MDC_IDC_PG_IMPLANT_DTM: NORMAL
MDC_IDC_PG_MFG: NORMAL
MDC_IDC_PG_MODEL: NORMAL
MDC_IDC_PG_SERIAL: NORMAL
MDC_IDC_PG_TYPE: NORMAL
MDC_IDC_SESS_CLINIC_NAME: NORMAL
MDC_IDC_SESS_DTM: NORMAL
MDC_IDC_SESS_TYPE: NORMAL
MDC_IDC_SET_ZONE_DETECTION_INTERVAL: 240 MS
MDC_IDC_SET_ZONE_DETECTION_INTERVAL: 273 MS
MDC_IDC_SET_ZONE_TYPE: NORMAL
MDC_IDC_SET_ZONE_TYPE: NORMAL
MDC_IDC_SET_ZONE_VENDOR_TYPE: NORMAL
MDC_IDC_SET_ZONE_VENDOR_TYPE: NORMAL
MDC_IDC_STAT_EPISODE_RECENT_COUNT: 0
MDC_IDC_STAT_EPISODE_RECENT_COUNT_DTM_END: NORMAL
MDC_IDC_STAT_EPISODE_RECENT_COUNT_DTM_START: NORMAL
MDC_IDC_STAT_EPISODE_TOTAL_COUNT: 0
MDC_IDC_STAT_EPISODE_TOTAL_COUNT: 0
MDC_IDC_STAT_EPISODE_TOTAL_COUNT_DTM_END: NORMAL
MDC_IDC_STAT_EPISODE_TOTAL_COUNT_DTM_END: NORMAL
MDC_IDC_STAT_EPISODE_TOTAL_COUNT_DTM_START: NORMAL
MDC_IDC_STAT_EPISODE_TOTAL_COUNT_DTM_START: NORMAL
MDC_IDC_STAT_EPISODE_TYPE: NORMAL
MDC_IDC_STAT_EPISODE_VENDOR_TYPE: NORMAL
MDC_IDC_STAT_TACHYTHERAPY_RECENT_DTM_END: NORMAL
MDC_IDC_STAT_TACHYTHERAPY_RECENT_DTM_START: NORMAL
MDC_IDC_STAT_TACHYTHERAPY_SHOCKS_DELIVERED_RECENT: 0
MDC_IDC_STAT_TACHYTHERAPY_SHOCKS_DELIVERED_TOTAL: 1
MDC_IDC_STAT_TACHYTHERAPY_TOTAL_DTM_END: NORMAL
MDC_IDC_STAT_TACHYTHERAPY_TOTAL_DTM_START: NORMAL

## 2020-10-12 DIAGNOSIS — I42.2 HYPERTROPHIC CARDIOMYOPATHY (H): ICD-10-CM

## 2020-10-15 RX ORDER — SPIRONOLACTONE 25 MG/1
25 TABLET ORAL DAILY
Qty: 90 TABLET | Refills: 0 | Status: SHIPPED | OUTPATIENT
Start: 2020-10-15

## 2020-10-15 RX ORDER — METOPROLOL SUCCINATE 100 MG/1
100 TABLET, EXTENDED RELEASE ORAL DAILY
Qty: 90 TABLET | Refills: 0 | Status: SHIPPED | OUTPATIENT
Start: 2020-10-15

## 2020-10-15 NOTE — TELEPHONE ENCOUNTER
spironolactone (ALDACTONE) 25 MG tablet    Last Written Prescription Date:  12/6/19  Last Fill Quantity: 90,   # refills: 3  Last Office Visit : 12/6/19  Future Office visit:  None    1/22/20   wnl    90 day to pharmacy    metoprolol succinate ER (TOPROL-XL) 100 MG 24 hr tablet

## 2020-11-07 ENCOUNTER — HEALTH MAINTENANCE LETTER (OUTPATIENT)
Age: 50
End: 2020-11-07

## 2020-12-11 ENCOUNTER — ANCILLARY PROCEDURE (OUTPATIENT)
Dept: CARDIOLOGY | Facility: CLINIC | Age: 50
End: 2020-12-11
Attending: INTERNAL MEDICINE
Payer: COMMERCIAL

## 2020-12-11 DIAGNOSIS — Z95.810 AICD (AUTOMATIC CARDIOVERTER/DEFIBRILLATOR) PRESENT: ICD-10-CM

## 2020-12-11 DIAGNOSIS — I46.9 CARDIAC ARREST (H): ICD-10-CM

## 2020-12-11 PROCEDURE — 93296 REM INTERROG EVL PM/IDS: CPT

## 2020-12-11 PROCEDURE — 93295 DEV INTERROG REMOTE 1/2/MLT: CPT | Performed by: INTERNAL MEDICINE

## 2020-12-17 LAB
MDC_IDC_LEAD_IMPLANT_DT: NORMAL
MDC_IDC_LEAD_LOCATION: NORMAL
MDC_IDC_LEAD_LOCATION_DETAIL_1: NORMAL
MDC_IDC_LEAD_MFG: NORMAL
MDC_IDC_LEAD_MODEL: NORMAL
MDC_IDC_LEAD_POLARITY_TYPE: NORMAL
MDC_IDC_LEAD_SERIAL: NORMAL
MDC_IDC_LEAD_SPECIAL_FUNCTION: NORMAL
MDC_IDC_MSMT_BATTERY_DTM: NORMAL
MDC_IDC_MSMT_BATTERY_REMAINING_PERCENTAGE: 39 %
MDC_IDC_MSMT_BATTERY_STATUS: NORMAL
MDC_IDC_PG_IMPLANT_DTM: NORMAL
MDC_IDC_PG_MFG: NORMAL
MDC_IDC_PG_MODEL: NORMAL
MDC_IDC_PG_SERIAL: NORMAL
MDC_IDC_PG_TYPE: NORMAL
MDC_IDC_SESS_CLINIC_NAME: NORMAL
MDC_IDC_SESS_DTM: NORMAL
MDC_IDC_SESS_TYPE: NORMAL
MDC_IDC_SET_ZONE_DETECTION_INTERVAL: 240 MS
MDC_IDC_SET_ZONE_DETECTION_INTERVAL: 273 MS
MDC_IDC_SET_ZONE_TYPE: NORMAL
MDC_IDC_SET_ZONE_TYPE: NORMAL
MDC_IDC_SET_ZONE_VENDOR_TYPE: NORMAL
MDC_IDC_SET_ZONE_VENDOR_TYPE: NORMAL
MDC_IDC_STAT_EPISODE_RECENT_COUNT: 0
MDC_IDC_STAT_EPISODE_RECENT_COUNT_DTM_END: NORMAL
MDC_IDC_STAT_EPISODE_RECENT_COUNT_DTM_START: NORMAL
MDC_IDC_STAT_EPISODE_TOTAL_COUNT: 0
MDC_IDC_STAT_EPISODE_TOTAL_COUNT: 0
MDC_IDC_STAT_EPISODE_TOTAL_COUNT_DTM_END: NORMAL
MDC_IDC_STAT_EPISODE_TOTAL_COUNT_DTM_END: NORMAL
MDC_IDC_STAT_EPISODE_TOTAL_COUNT_DTM_START: NORMAL
MDC_IDC_STAT_EPISODE_TOTAL_COUNT_DTM_START: NORMAL
MDC_IDC_STAT_EPISODE_TYPE: NORMAL
MDC_IDC_STAT_EPISODE_VENDOR_TYPE: NORMAL
MDC_IDC_STAT_TACHYTHERAPY_RECENT_DTM_END: NORMAL
MDC_IDC_STAT_TACHYTHERAPY_RECENT_DTM_START: NORMAL
MDC_IDC_STAT_TACHYTHERAPY_SHOCKS_DELIVERED_RECENT: 0
MDC_IDC_STAT_TACHYTHERAPY_SHOCKS_DELIVERED_TOTAL: 1
MDC_IDC_STAT_TACHYTHERAPY_TOTAL_DTM_END: NORMAL
MDC_IDC_STAT_TACHYTHERAPY_TOTAL_DTM_START: NORMAL

## 2021-01-11 ENCOUNTER — MYC MEDICAL ADVICE (OUTPATIENT)
Dept: CARDIOLOGY | Facility: CLINIC | Age: 51
End: 2021-01-11

## 2021-01-15 NOTE — TELEPHONE ENCOUNTER
"Called Lauren to gather more information: Most recent labs are from 1/5/2021 in Care Everywhere. The only thing that seems off is the GFR? BP runs consistently in 110-120/80s, per Lauren. This is confirmed in values I can see in chart. She states she hasn't taken BP in a few months, though. No symptoms. She states the her MD is \"just really worried about kidney function.\"     Per Dr. Paz, she is fine with adding a low dose lisinopril but honestly I am not sure she needs it.  GFR is just barely below normal.  I feel that she would benefit more from the spironolactone than the lisinopril if I had to choose between the two.     Called and relayed information to Lauren. Patient verbalized understanding and is in agreement to the plan.    "

## 2021-02-10 ENCOUNTER — ANCILLARY PROCEDURE (OUTPATIENT)
Dept: CARDIOLOGY | Facility: CLINIC | Age: 51
End: 2021-02-10
Attending: INTERNAL MEDICINE
Payer: COMMERCIAL

## 2021-02-10 DIAGNOSIS — I46.9 CARDIAC ARREST (H): ICD-10-CM

## 2021-02-10 DIAGNOSIS — Z95.810 AICD (AUTOMATIC CARDIOVERTER/DEFIBRILLATOR) PRESENT: ICD-10-CM

## 2021-02-10 PROCEDURE — 93260 PRGRMG DEV EVAL IMPLTBL SYS: CPT | Performed by: INTERNAL MEDICINE

## 2021-02-12 LAB
MDC_IDC_LEAD_IMPLANT_DT: NORMAL
MDC_IDC_LEAD_LOCATION: NORMAL
MDC_IDC_LEAD_LOCATION_DETAIL_1: NORMAL
MDC_IDC_LEAD_MFG: NORMAL
MDC_IDC_LEAD_MODEL: NORMAL
MDC_IDC_LEAD_POLARITY_TYPE: NORMAL
MDC_IDC_LEAD_SERIAL: NORMAL
MDC_IDC_LEAD_SPECIAL_FUNCTION: NORMAL
MDC_IDC_MSMT_CAP_CHARGE_TYPE: NORMAL
MDC_IDC_PG_IMPLANT_DTM: NORMAL
MDC_IDC_PG_MFG: NORMAL
MDC_IDC_PG_MODEL: NORMAL
MDC_IDC_PG_SERIAL: NORMAL
MDC_IDC_PG_TYPE: NORMAL
MDC_IDC_SESS_CLINIC_NAME: NORMAL
MDC_IDC_SESS_DTM: NORMAL
MDC_IDC_SESS_TYPE: NORMAL
MDC_IDC_SET_ZONE_DETECTION_INTERVAL: 240 MS
MDC_IDC_SET_ZONE_DETECTION_INTERVAL: 273 MS
MDC_IDC_SET_ZONE_TYPE: NORMAL
MDC_IDC_SET_ZONE_TYPE: NORMAL
MDC_IDC_SET_ZONE_VENDOR_TYPE: NORMAL
MDC_IDC_SET_ZONE_VENDOR_TYPE: NORMAL

## 2021-05-11 ENCOUNTER — ANCILLARY PROCEDURE (OUTPATIENT)
Dept: CARDIOLOGY | Facility: CLINIC | Age: 51
End: 2021-05-11
Attending: INTERNAL MEDICINE
Payer: COMMERCIAL

## 2021-05-11 DIAGNOSIS — Z95.810 AICD (AUTOMATIC CARDIOVERTER/DEFIBRILLATOR) PRESENT: ICD-10-CM

## 2021-05-11 DIAGNOSIS — I46.9 CARDIAC ARREST (H): ICD-10-CM

## 2021-05-11 PROCEDURE — 93296 REM INTERROG EVL PM/IDS: CPT

## 2021-05-11 PROCEDURE — 93295 DEV INTERROG REMOTE 1/2/MLT: CPT | Performed by: INTERNAL MEDICINE

## 2021-05-13 LAB
MDC_IDC_LEAD_IMPLANT_DT: NORMAL
MDC_IDC_LEAD_LOCATION: NORMAL
MDC_IDC_LEAD_LOCATION_DETAIL_1: NORMAL
MDC_IDC_LEAD_MFG: NORMAL
MDC_IDC_LEAD_MODEL: NORMAL
MDC_IDC_LEAD_POLARITY_TYPE: NORMAL
MDC_IDC_LEAD_SERIAL: NORMAL
MDC_IDC_LEAD_SPECIAL_FUNCTION: NORMAL
MDC_IDC_MSMT_BATTERY_DTM: NORMAL
MDC_IDC_MSMT_BATTERY_REMAINING_PERCENTAGE: 33 %
MDC_IDC_MSMT_BATTERY_STATUS: NORMAL
MDC_IDC_PG_IMPLANT_DTM: NORMAL
MDC_IDC_PG_MFG: NORMAL
MDC_IDC_PG_MODEL: NORMAL
MDC_IDC_PG_SERIAL: NORMAL
MDC_IDC_PG_TYPE: NORMAL
MDC_IDC_SESS_CLINIC_NAME: NORMAL
MDC_IDC_SESS_DTM: NORMAL
MDC_IDC_SESS_TYPE: NORMAL
MDC_IDC_SET_ZONE_DETECTION_INTERVAL: 240 MS
MDC_IDC_SET_ZONE_DETECTION_INTERVAL: 273 MS
MDC_IDC_SET_ZONE_TYPE: NORMAL
MDC_IDC_SET_ZONE_TYPE: NORMAL
MDC_IDC_SET_ZONE_VENDOR_TYPE: NORMAL
MDC_IDC_SET_ZONE_VENDOR_TYPE: NORMAL
MDC_IDC_STAT_EPISODE_RECENT_COUNT: 0
MDC_IDC_STAT_EPISODE_RECENT_COUNT_DTM_END: NORMAL
MDC_IDC_STAT_EPISODE_RECENT_COUNT_DTM_START: NORMAL
MDC_IDC_STAT_EPISODE_TOTAL_COUNT: 0
MDC_IDC_STAT_EPISODE_TOTAL_COUNT: 0
MDC_IDC_STAT_EPISODE_TOTAL_COUNT_DTM_END: NORMAL
MDC_IDC_STAT_EPISODE_TOTAL_COUNT_DTM_END: NORMAL
MDC_IDC_STAT_EPISODE_TOTAL_COUNT_DTM_START: NORMAL
MDC_IDC_STAT_EPISODE_TOTAL_COUNT_DTM_START: NORMAL
MDC_IDC_STAT_EPISODE_TYPE: NORMAL
MDC_IDC_STAT_EPISODE_VENDOR_TYPE: NORMAL
MDC_IDC_STAT_TACHYTHERAPY_RECENT_DTM_END: NORMAL
MDC_IDC_STAT_TACHYTHERAPY_RECENT_DTM_START: NORMAL
MDC_IDC_STAT_TACHYTHERAPY_SHOCKS_DELIVERED_RECENT: 0
MDC_IDC_STAT_TACHYTHERAPY_SHOCKS_DELIVERED_TOTAL: 1
MDC_IDC_STAT_TACHYTHERAPY_TOTAL_DTM_END: NORMAL
MDC_IDC_STAT_TACHYTHERAPY_TOTAL_DTM_START: NORMAL

## 2021-08-24 ENCOUNTER — ANCILLARY PROCEDURE (OUTPATIENT)
Dept: CARDIOLOGY | Facility: CLINIC | Age: 51
End: 2021-08-24
Attending: INTERNAL MEDICINE
Payer: COMMERCIAL

## 2021-08-24 DIAGNOSIS — Z95.810 AICD (AUTOMATIC CARDIOVERTER/DEFIBRILLATOR) PRESENT: ICD-10-CM

## 2021-08-24 DIAGNOSIS — I46.9 CARDIAC ARREST (H): ICD-10-CM

## 2021-08-24 PROCEDURE — 93296 REM INTERROG EVL PM/IDS: CPT

## 2021-09-05 ENCOUNTER — HEALTH MAINTENANCE LETTER (OUTPATIENT)
Age: 51
End: 2021-09-05

## 2021-12-26 ENCOUNTER — HEALTH MAINTENANCE LETTER (OUTPATIENT)
Age: 51
End: 2021-12-26

## 2022-01-28 ENCOUNTER — ANCILLARY PROCEDURE (OUTPATIENT)
Dept: CARDIOLOGY | Facility: CLINIC | Age: 52
End: 2022-01-28
Attending: INTERNAL MEDICINE
Payer: COMMERCIAL

## 2022-01-28 ENCOUNTER — LAB (OUTPATIENT)
Dept: LAB | Facility: CLINIC | Age: 52
End: 2022-01-28
Attending: INTERNAL MEDICINE
Payer: COMMERCIAL

## 2022-01-28 VITALS
DIASTOLIC BLOOD PRESSURE: 84 MMHG | BODY MASS INDEX: 29.23 KG/M2 | SYSTOLIC BLOOD PRESSURE: 122 MMHG | OXYGEN SATURATION: 96 % | HEIGHT: 66 IN | HEART RATE: 64 BPM | WEIGHT: 181.9 LBS

## 2022-01-28 DIAGNOSIS — I46.9 CARDIAC ARREST (H): ICD-10-CM

## 2022-01-28 DIAGNOSIS — Z95.810 AICD (AUTOMATIC CARDIOVERTER/DEFIBRILLATOR) PRESENT: ICD-10-CM

## 2022-01-28 DIAGNOSIS — I42.1 HOCM (HYPERTROPHIC OBSTRUCTIVE CARDIOMYOPATHY) (H): ICD-10-CM

## 2022-01-28 DIAGNOSIS — I42.2 HYPERTROPHIC CARDIOMYOPATHY (H): ICD-10-CM

## 2022-01-28 LAB
ALBUMIN SERPL-MCNC: 3.7 G/DL (ref 3.4–5)
ALP SERPL-CCNC: 87 U/L (ref 40–150)
ALT SERPL W P-5'-P-CCNC: 27 U/L (ref 0–50)
ANION GAP SERPL CALCULATED.3IONS-SCNC: 6 MMOL/L (ref 3–14)
AST SERPL W P-5'-P-CCNC: 21 U/L (ref 0–45)
BASOPHILS # BLD AUTO: 0 10E3/UL (ref 0–0.2)
BASOPHILS NFR BLD AUTO: 0 %
BILIRUB SERPL-MCNC: 0.5 MG/DL (ref 0.2–1.3)
BUN SERPL-MCNC: 14 MG/DL (ref 7–30)
CALCIUM SERPL-MCNC: 9.3 MG/DL (ref 8.5–10.1)
CHLORIDE BLD-SCNC: 106 MMOL/L (ref 94–109)
CO2 SERPL-SCNC: 29 MMOL/L (ref 20–32)
CREAT SERPL-MCNC: 0.95 MG/DL (ref 0.52–1.04)
EOSINOPHIL # BLD AUTO: 0.1 10E3/UL (ref 0–0.7)
EOSINOPHIL NFR BLD AUTO: 2 %
ERYTHROCYTE [DISTWIDTH] IN BLOOD BY AUTOMATED COUNT: 12.5 % (ref 10–15)
GFR SERPL CREATININE-BSD FRML MDRD: 71 ML/MIN/1.73M2
GLUCOSE BLD-MCNC: 103 MG/DL (ref 70–99)
HCT VFR BLD AUTO: 45.3 % (ref 35–47)
HGB BLD-MCNC: 14.6 G/DL (ref 11.7–15.7)
IMM GRANULOCYTES # BLD: 0 10E3/UL
IMM GRANULOCYTES NFR BLD: 0 %
LVEF ECHO: NORMAL
LYMPHOCYTES # BLD AUTO: 1.7 10E3/UL (ref 0.8–5.3)
LYMPHOCYTES NFR BLD AUTO: 36 %
MCH RBC QN AUTO: 29.9 PG (ref 26.5–33)
MCHC RBC AUTO-ENTMCNC: 32.2 G/DL (ref 31.5–36.5)
MCV RBC AUTO: 93 FL (ref 78–100)
MDC_IDC_LEAD_IMPLANT_DT: NORMAL
MDC_IDC_LEAD_LOCATION: NORMAL
MDC_IDC_LEAD_LOCATION_DETAIL_1: NORMAL
MDC_IDC_LEAD_MFG: NORMAL
MDC_IDC_LEAD_MODEL: NORMAL
MDC_IDC_LEAD_POLARITY_TYPE: NORMAL
MDC_IDC_LEAD_SERIAL: NORMAL
MDC_IDC_LEAD_SPECIAL_FUNCTION: NORMAL
MDC_IDC_MSMT_BATTERY_DTM: NORMAL
MDC_IDC_MSMT_BATTERY_REMAINING_PERCENTAGE: 25 %
MDC_IDC_MSMT_BATTERY_STATUS: NORMAL
MDC_IDC_PG_IMPLANT_DTM: NORMAL
MDC_IDC_PG_MFG: NORMAL
MDC_IDC_PG_MODEL: NORMAL
MDC_IDC_PG_SERIAL: NORMAL
MDC_IDC_PG_TYPE: NORMAL
MDC_IDC_SESS_CLINIC_NAME: NORMAL
MDC_IDC_SESS_DTM: NORMAL
MDC_IDC_SESS_TYPE: NORMAL
MDC_IDC_SET_ZONE_DETECTION_INTERVAL: 240 MS
MDC_IDC_SET_ZONE_DETECTION_INTERVAL: 272 MS
MDC_IDC_SET_ZONE_TYPE: NORMAL
MDC_IDC_SET_ZONE_TYPE: NORMAL
MDC_IDC_SET_ZONE_VENDOR_TYPE: NORMAL
MDC_IDC_SET_ZONE_VENDOR_TYPE: NORMAL
MDC_IDC_STAT_AT_BURDEN_PERCENT: 0 %
MDC_IDC_STAT_EPISODE_RECENT_COUNT: 0
MDC_IDC_STAT_EPISODE_RECENT_COUNT_DTM_END: NORMAL
MDC_IDC_STAT_EPISODE_RECENT_COUNT_DTM_START: NORMAL
MDC_IDC_STAT_EPISODE_TOTAL_COUNT: 0
MDC_IDC_STAT_EPISODE_TOTAL_COUNT: 0
MDC_IDC_STAT_EPISODE_TOTAL_COUNT_DTM_END: NORMAL
MDC_IDC_STAT_EPISODE_TOTAL_COUNT_DTM_END: NORMAL
MDC_IDC_STAT_EPISODE_TOTAL_COUNT_DTM_START: NORMAL
MDC_IDC_STAT_EPISODE_TOTAL_COUNT_DTM_START: NORMAL
MDC_IDC_STAT_EPISODE_TYPE: NORMAL
MDC_IDC_STAT_EPISODE_VENDOR_TYPE: NORMAL
MDC_IDC_STAT_TACHYTHERAPY_RECENT_DTM_END: NORMAL
MDC_IDC_STAT_TACHYTHERAPY_RECENT_DTM_START: NORMAL
MDC_IDC_STAT_TACHYTHERAPY_SHOCKS_DELIVERED_RECENT: 0
MDC_IDC_STAT_TACHYTHERAPY_SHOCKS_DELIVERED_TOTAL: 1
MDC_IDC_STAT_TACHYTHERAPY_TOTAL_DTM_END: NORMAL
MDC_IDC_STAT_TACHYTHERAPY_TOTAL_DTM_START: NORMAL
MONOCYTES # BLD AUTO: 0.5 10E3/UL (ref 0–1.3)
MONOCYTES NFR BLD AUTO: 11 %
NEUTROPHILS # BLD AUTO: 2.4 10E3/UL (ref 1.6–8.3)
NEUTROPHILS NFR BLD AUTO: 51 %
NRBC # BLD AUTO: 0 10E3/UL
NRBC BLD AUTO-RTO: 0 /100
PLATELET # BLD AUTO: 211 10E3/UL (ref 150–450)
POTASSIUM BLD-SCNC: 4.1 MMOL/L (ref 3.4–5.3)
PROT SERPL-MCNC: 7 G/DL (ref 6.8–8.8)
RBC # BLD AUTO: 4.88 10E6/UL (ref 3.8–5.2)
SODIUM SERPL-SCNC: 141 MMOL/L (ref 133–144)
WBC # BLD AUTO: 4.7 10E3/UL (ref 4–11)

## 2022-01-28 PROCEDURE — 93005 ELECTROCARDIOGRAM TRACING: CPT

## 2022-01-28 PROCEDURE — 93260 PRGRMG DEV EVAL IMPLTBL SYS: CPT | Performed by: INTERNAL MEDICINE

## 2022-01-28 PROCEDURE — G0463 HOSPITAL OUTPT CLINIC VISIT: HCPCS | Mod: 25

## 2022-01-28 PROCEDURE — 93306 TTE W/DOPPLER COMPLETE: CPT | Performed by: INTERNAL MEDICINE

## 2022-01-28 PROCEDURE — 99215 OFFICE O/P EST HI 40 MIN: CPT | Mod: 25 | Performed by: INTERNAL MEDICINE

## 2022-01-28 PROCEDURE — 36415 COLL VENOUS BLD VENIPUNCTURE: CPT | Performed by: PATHOLOGY

## 2022-01-28 PROCEDURE — 85025 COMPLETE CBC W/AUTO DIFF WBC: CPT | Performed by: PATHOLOGY

## 2022-01-28 PROCEDURE — 80053 COMPREHEN METABOLIC PANEL: CPT | Performed by: PATHOLOGY

## 2022-01-28 RX ORDER — RIBOFLAVIN (VITAMIN B2) 100 MG
100 TABLET ORAL 3 TIMES DAILY
COMMUNITY
End: 2023-06-19

## 2022-01-28 RX ORDER — ZINC GLUCONATE 50 MG
50 TABLET ORAL DAILY
COMMUNITY
End: 2023-06-19

## 2022-01-28 RX ORDER — ESCITALOPRAM OXALATE 10 MG/1
10 TABLET ORAL
COMMUNITY
Start: 2021-10-08

## 2022-01-28 ASSESSMENT — PAIN SCALES - GENERAL: PAINLEVEL: NO PAIN (0)

## 2022-01-28 ASSESSMENT — MIFFLIN-ST. JEOR: SCORE: 1450.33

## 2022-01-28 NOTE — PATIENT INSTRUCTIONS
It was a pleasure to see you in clinic today.  Please do not hesitate to call with any questions or concerns.  You are scheduled for a remote transmission on 5/3/22, 8/5/22 and 11/10/22.  We look forward to seeing you in clinic at your next device check in 1 year.    Ade Monreal, RN, MS, CCRN  Electrophysiology Nurse Clinician  [facility]    During Business Hours Please Call:  [phone]  After Hours Please Call:  [phone] - select option #4 and ask for job code [phone]

## 2022-01-28 NOTE — LETTER
1/28/2022      RE: Lauren Lewis  [address] [facility] [address]       Dear Colleague,    Thank you for the opportunity to participate in the care of your patient, Lauren Lewis, at the [facility] [place] at [facility]. Please see a copy of my visit note below.    HPI:  51-year-old female with a history of hypertrophic cardiomyopathy with MYBPC3 mutation, history of V-fib arrest s/p subcutaneous  ICD presents for ongoing evaluation and management.  Patient reports that she has been feeling well.  She denies any chest pain or pressure, sob/christine, orthopnea, pnd, palpitations, syncope/presyncope, abraham or significant change in exercise tolerance.  She denies any problems with her medications and reports compliance.      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  Fortunately, her children were found to NOT have this gene mutation.    SOCIAL HISTORY:  Social History     Social History     Marital Status:      Spouse Name: N/A     Number of Children: N/A     Years of Education: N/A  "    Social History Main Topics     Smoking status: Never Smoker      Smokeless tobacco: None     Alcohol Use: No     Drug Use: No     Sexual Activity:     Partners: Male     Other Topics Concern     None     Social History Narrative       CURRENT MEDICATIONS:  Current Outpatient Medications   Medication Sig Dispense Refill     escitalopram (LEXAPRO) 10 MG tablet Take 10 mg by mouth       levothyroxine (SYNTHROID) 100 MCG tablet Take 1 tablet by mouth daily       metoprolol succinate ER (TOPROL-XL) 100 MG 24 hr tablet Take 1 tablet (100 mg) by mouth daily 90 tablet 0     OMEPRAZOLE        simvastatin (ZOCOR) 20 MG tablet Take 20 mg by mouth At Bedtime        spironolactone (ALDACTONE) 25 MG tablet Take 1 tablet (25 mg) by mouth daily 90 tablet 0     vitamin C (ASCORBIC ACID) 100 MG tablet Take 100 mg by mouth 3 times daily       VITAMIN D, CHOLECALCIFEROL, PO Take 1 capsule by mouth daily       zinc gluconate 50 MG tablet Take 50 mg by mouth daily       desogestrel-ethinyl estradiol (RECLIPSEN) 0.15-30 MG-MCG per tablet Take 1 tablet by mouth daily As directed (Patient not taking: Reported on 1/28/2022)       multivitamin, therapeutic with minerals (MULTI-VITAMIN) TABS Take 1 tablet by mouth daily (Patient not taking: Reported on 1/28/2022)       PARoxetine (PAXIL) 30 MG tablet Take 1 tablet by mouth every morning (Patient not taking: Reported on 1/28/2022)            EXAM:  /84 (BP Location: Right arm, Patient Position: Chair, Cuff Size: Adult Large)   Pulse 64   Ht 1.682 m (5' 6.22\")   Wt 82.5 kg (181 lb 14.4 oz)   SpO2 96%   BMI 29.16 kg/m    General: appears comfortable, alert and articulate  Head: normocephalic, atraumatic  Eyes: anicteric sclera, EOMI  Neck: no adenopathy, 2+ carotids  Heart: regular, S1/S2, no murmur, gallop, rub, estimated JVP 7-8cm.  Lungs: clear, no rales or wheezing  Abdomen: soft, non-tender, bowel sounds present, no hepatomegaly  Extremities: warm, well perfused.  no clubbing, " cyanosis or edema  Neurological: normal speech and affect, no gross motor deficits    Labs:  CBC RESULTS:  Lab Results   Component Value Date    WBC 4.7 01/28/2022    WBC 8.2 12/12/2017    RBC 4.88 01/28/2022    RBC 4.46 12/12/2017    HGB 14.6 01/28/2022    HGB 14.2 12/12/2017    HCT 45.3 01/28/2022    HCT 42.3 12/12/2017    MCV 93 01/28/2022    MCV 95 12/12/2017    MCH 29.9 01/28/2022    MCH 31.8 12/12/2017    MCHC 32.2 01/28/2022    MCHC 33.6 12/12/2017    RDW 12.5 01/28/2022    RDW 11.7 12/12/2017     01/28/2022     12/12/2017       CMP RESULTS:  Lab Results   Component Value Date     01/28/2022     01/22/2020    POTASSIUM 4.1 01/28/2022    POTASSIUM 4.2 01/22/2020    CHLORIDE 106 01/28/2022    CHLORIDE 107 01/22/2020    CO2 29 01/28/2022    CO2 27 01/22/2020    ANIONGAP 6 01/28/2022    ANIONGAP 5 01/22/2020     (H) 01/28/2022    GLC 92 01/22/2020    BUN 14 01/28/2022    BUN 10 01/22/2020    CR 0.95 01/28/2022    CR 1.07 01/22/2020    GFRESTIMATED 71 01/28/2022    GFRESTIMATED 54 01/22/2020    GFRESTBLACK 69 12/06/2019    ZAKIA 9.3 01/28/2022    ZAKIA 9.1 01/22/2020    BILITOTAL 0.5 01/28/2022    BILITOTAL 0.4 12/12/2017    ALBUMIN 3.7 01/28/2022    ALBUMIN 3.3 (L) 12/12/2017    ALKPHOS 87 01/28/2022    ALKPHOS 59 12/12/2017    ALT 27 01/28/2022    ALT 22 12/12/2017    AST 21 01/28/2022    AST 18 12/12/2017              Echo 2017  Normal left ventricular size, thickness, and function. EF 55-60%. Normal left  ventricular filling for age. No regional wall motion abnormalities are seen.  Normal right ventricular size and function.  No significant valvular pathology.  The inferior vena cava was normal in size with preserved respiratory variability. No pericardial effusion.   No change from prior study 12/13/16.    Echo 12/6/19  Left ventricular function, chamber size, wall motion, and wall thickness are  normal.The EF is 60-65%.  Right ventricular function, chamber size, wall motion, and  thickness are  normal.  This study was compared with the study from 12/12/17: There has been no  change.    Echo 1/28/22  Global and regional left ventricular function is normal with an EF of 55-60%.  Left ventricular wall thickness is normal.  Global right ventricular function is normal.  Mild right ventricular dilation is present.     This study was compared with the study from 12/6/2019 .There has been no  Change.    Device interrogation 1/28/22:  Device: [employer] SQ ICD  Presenting EGM: NSR @ 65 bpm  Electrode Impedance Status: OK   Estimated battery longevity to RRT = 25%.   Ventricular Episodes: 0  Setting Changes: None    EKG 1/28/22        Assessment and Plan:  51-year-old lady with a history of hypertrophic cardiomyopathy with  MYBPC3 mutation, history of V-fib arrest s/p ICD presents for ongoing evaluation and management.     - hypertrophic cardiomyopathy with  MYBPC3 mutation, history of V-fib arrest s/p subcutaneous [employer] ICD:  The patient continues to feel well.  She remains euvolemic today by history and no exam.  ICD interrogation confirms no recent significant arrhythmias.  Echo today with stable findings.  BP today well controlled.  Continue metoprolol xl 100 mg daily and spironolactone 25mg daily.   Pt aware of hypertrophic cardiomyopathy exercise restrictions.  Pt also aware of recommendation for all first degree relatives to undergo clinical screening unless they opt to pursue genetic testing and are found to be gene negative as with her children,       Follow up:  2 years with an ECHO, labs, EKG and device check prior.  Will be happy to see sooner if change in clinical status or new questions/concerns arise.      Belle Paz MD  Section Head - Advanced Heart Failure, Transplantation and Mechanical Circulatory Support  Director - Adult Congenital and Cardiovascular Genetics Center  Associate Professor of Medicine, [employer] [facility]    I spent a total of 40 minutes  today in chart review as well as personally reviewing recent cardiac testing and/or laboratory results, today's history and examination, and discussion and counseling with the patient

## 2022-01-28 NOTE — NURSING NOTE
Chief Complaint   Patient presents with     Follow Up     51 year old female with history of HCM presenting for follow up.     Vitals were taken, medications reconciled, and EKG was performed.    Daniel Alonso EMT  12:30 PM

## 2022-01-28 NOTE — PROGRESS NOTES
HPI:  51-year-old female with a history of hypertrophic cardiomyopathy with MYBPC3 mutation, history of V-fib arrest s/p subcutaneous  ICD presents for ongoing evaluation and management.  Patient reports that she has been feeling well.  She denies any chest pain or pressure, sob/christine, orthopnea, pnd, palpitations, syncope/presyncope, abraham or significant change in exercise tolerance.  She denies any problems with her medications and reports compliance.      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  Fortunately, her children were found to NOT have this gene mutation.    SOCIAL HISTORY:  Social History     Social History     Marital Status:      Spouse Name: N/A     Number of Children: N/A     Years of Education: N/A     Social History Main Topics     Smoking status: Never Smoker      Smokeless tobacco: None     Alcohol Use: No     Drug Use: No     Sexual Activity:     Partners: Male     Other Topics Concern     None     Social History Narrative       CURRENT MEDICATIONS:  Current Outpatient Medications   Medication Sig Dispense Refill     escitalopram  "(LEXAPRO) 10 MG tablet Take 10 mg by mouth       levothyroxine (SYNTHROID) 100 MCG tablet Take 1 tablet by mouth daily       metoprolol succinate ER (TOPROL-XL) 100 MG 24 hr tablet Take 1 tablet (100 mg) by mouth daily 90 tablet 0     OMEPRAZOLE        simvastatin (ZOCOR) 20 MG tablet Take 20 mg by mouth At Bedtime        spironolactone (ALDACTONE) 25 MG tablet Take 1 tablet (25 mg) by mouth daily 90 tablet 0     vitamin C (ASCORBIC ACID) 100 MG tablet Take 100 mg by mouth 3 times daily       VITAMIN D, CHOLECALCIFEROL, PO Take 1 capsule by mouth daily       zinc gluconate 50 MG tablet Take 50 mg by mouth daily       desogestrel-ethinyl estradiol (RECLIPSEN) 0.15-30 MG-MCG per tablet Take 1 tablet by mouth daily As directed (Patient not taking: Reported on 1/28/2022)       multivitamin, therapeutic with minerals (MULTI-VITAMIN) TABS Take 1 tablet by mouth daily (Patient not taking: Reported on 1/28/2022)       PARoxetine (PAXIL) 30 MG tablet Take 1 tablet by mouth every morning (Patient not taking: Reported on 1/28/2022)            EXAM:  /84 (BP Location: Right arm, Patient Position: Chair, Cuff Size: Adult Large)   Pulse 64   Ht 1.682 m (5' 6.22\")   Wt 82.5 kg (181 lb 14.4 oz)   SpO2 96%   BMI 29.16 kg/m    General: appears comfortable, alert and articulate  Head: normocephalic, atraumatic  Eyes: anicteric sclera, EOMI  Neck: no adenopathy, 2+ carotids  Heart: regular, S1/S2, no murmur, gallop, rub, estimated JVP 7-8cm.  Lungs: clear, no rales or wheezing  Abdomen: soft, non-tender, bowel sounds present, no hepatomegaly  Extremities: warm, well perfused.  no clubbing, cyanosis or edema  Neurological: normal speech and affect, no gross motor deficits    Labs:  CBC RESULTS:  Lab Results   Component Value Date    WBC 4.7 01/28/2022    WBC 8.2 12/12/2017    RBC 4.88 01/28/2022    RBC 4.46 12/12/2017    HGB 14.6 01/28/2022    HGB 14.2 12/12/2017    HCT 45.3 01/28/2022    HCT 42.3 12/12/2017    MCV 93 " 01/28/2022    MCV 95 12/12/2017    MCH 29.9 01/28/2022    MCH 31.8 12/12/2017    MCHC 32.2 01/28/2022    MCHC 33.6 12/12/2017    RDW 12.5 01/28/2022    RDW 11.7 12/12/2017     01/28/2022     12/12/2017       CMP RESULTS:  Lab Results   Component Value Date     01/28/2022     01/22/2020    POTASSIUM 4.1 01/28/2022    POTASSIUM 4.2 01/22/2020    CHLORIDE 106 01/28/2022    CHLORIDE 107 01/22/2020    CO2 29 01/28/2022    CO2 27 01/22/2020    ANIONGAP 6 01/28/2022    ANIONGAP 5 01/22/2020     (H) 01/28/2022    GLC 92 01/22/2020    BUN 14 01/28/2022    BUN 10 01/22/2020    CR 0.95 01/28/2022    CR 1.07 01/22/2020    GFRESTIMATED 71 01/28/2022    GFRESTIMATED 54 01/22/2020    GFRESTBLACK 69 12/06/2019    ZAKIA 9.3 01/28/2022    ZAKIA 9.1 01/22/2020    BILITOTAL 0.5 01/28/2022    BILITOTAL 0.4 12/12/2017    ALBUMIN 3.7 01/28/2022    ALBUMIN 3.3 (L) 12/12/2017    ALKPHOS 87 01/28/2022    ALKPHOS 59 12/12/2017    ALT 27 01/28/2022    ALT 22 12/12/2017    AST 21 01/28/2022    AST 18 12/12/2017              Echo 2017  Normal left ventricular size, thickness, and function. EF 55-60%. Normal left  ventricular filling for age. No regional wall motion abnormalities are seen.  Normal right ventricular size and function.  No significant valvular pathology.  The inferior vena cava was normal in size with preserved respiratory variability. No pericardial effusion.   No change from prior study 12/13/16.    Echo 12/6/19  Left ventricular function, chamber size, wall motion, and wall thickness are  normal.The EF is 60-65%.  Right ventricular function, chamber size, wall motion, and thickness are  normal.  This study was compared with the study from 12/12/17: There has been no  change.    Echo 1/28/22  Global and regional left ventricular function is normal with an EF of 55-60%.  Left ventricular wall thickness is normal.  Global right ventricular function is normal.  Mild right ventricular dilation is  present.     This study was compared with the study from 12/6/2019 .There has been no  Change.    Device interrogation 1/28/22:  Device: [employer] SQ ICD  Presenting EGM: NSR @ 65 bpm  Electrode Impedance Status: OK   Estimated battery longevity to RRT = 25%.   Ventricular Episodes: 0  Setting Changes: None    EKG 1/28/22        Assessment and Plan:  51-year-old lady with a history of hypertrophic cardiomyopathy with  MYBPC3 mutation, history of V-fib arrest s/p ICD presents for ongoing evaluation and management.     - hypertrophic cardiomyopathy with  MYBPC3 mutation, history of V-fib arrest s/p subcutaneous [employer] ICD:  The patient continues to feel well.  She remains euvolemic today by history and no exam.  ICD interrogation confirms no recent significant arrhythmias.  Echo today with stable findings.  BP today well controlled.  Continue metoprolol xl 100 mg daily and spironolactone 25mg daily.   Pt aware of hypertrophic cardiomyopathy exercise restrictions.  Pt also aware of recommendation for all first degree relatives to undergo clinical screening unless they opt to pursue genetic testing and are found to be gene negative as with her children,       Follow up:  2 years with an ECHO, labs, EKG and device check prior.  Will be happy to see sooner if change in clinical status or new questions/concerns arise.      Belle Paz MD  Section Head - Advanced Heart Failure, Transplantation and Mechanical Circulatory Support  Director - Adult Congenital and Cardiovascular Genetics Center  Associate Professor of Medicine, [employer] [facility]    I spent a total of 40 minutes today in chart review as well as personally reviewing recent cardiac testing and/or laboratory results, today's history and examination, and discussion and counseling with the patient

## 2022-01-28 NOTE — NURSING NOTE
Cardiac Testing: Patient given instructions regarding  echocardiogram . Discussed purpose, preparation, procedure and when to expect results reported back to the patient. Patient demonstrated understanding of this information and agreed to call with further questions or concerns.    Diet: Patient instructed regarding a heart healthy diet, including discussion of reduced fat and sodium intake. Patient demonstrated understanding of this information and agreed to call with further questions or concerns.    Labs: Patient was given results of the laboratory testing obtained today. Patient was instructed to return for the next laboratory testing in 2 years. Patient demonstrated understanding of this information and agreed to call with further questions or concerns.     Return Appointment: Patient given instructions regarding scheduling next clinic visit. Patient demonstrated understanding of this information and agreed to call with further questions or concerns.    Patient stated she understood all health information given and agreed to call with further questions or concerns.  Rafaela Hale RN on 1/28/2022 at 12:57 PM

## 2022-01-31 LAB
ATRIAL RATE - MUSE: 64 BPM
DIASTOLIC BLOOD PRESSURE - MUSE: NORMAL MMHG
INTERPRETATION ECG - MUSE: NORMAL
P AXIS - MUSE: 57 DEGREES
PR INTERVAL - MUSE: 170 MS
QRS DURATION - MUSE: 72 MS
QT - MUSE: 412 MS
QTC - MUSE: 425 MS
R AXIS - MUSE: -5 DEGREES
SYSTOLIC BLOOD PRESSURE - MUSE: NORMAL MMHG
T AXIS - MUSE: -21 DEGREES
VENTRICULAR RATE- MUSE: 64 BPM

## 2022-05-03 ENCOUNTER — ANCILLARY PROCEDURE (OUTPATIENT)
Dept: CARDIOLOGY | Facility: CLINIC | Age: 52
End: 2022-05-03
Attending: INTERNAL MEDICINE
Payer: COMMERCIAL

## 2022-05-03 DIAGNOSIS — Z95.810 AICD (AUTOMATIC CARDIOVERTER/DEFIBRILLATOR) PRESENT: ICD-10-CM

## 2022-05-03 DIAGNOSIS — I46.9 CARDIAC ARREST (H): ICD-10-CM

## 2022-05-03 PROCEDURE — 93296 REM INTERROG EVL PM/IDS: CPT

## 2022-05-03 PROCEDURE — 93295 DEV INTERROG REMOTE 1/2/MLT: CPT | Performed by: INTERNAL MEDICINE

## 2022-06-01 LAB
MDC_IDC_LEAD_IMPLANT_DT: NORMAL
MDC_IDC_LEAD_LOCATION: NORMAL
MDC_IDC_LEAD_LOCATION_DETAIL_1: NORMAL
MDC_IDC_LEAD_MFG: NORMAL
MDC_IDC_LEAD_MODEL: NORMAL
MDC_IDC_LEAD_POLARITY_TYPE: NORMAL
MDC_IDC_LEAD_SERIAL: NORMAL
MDC_IDC_LEAD_SPECIAL_FUNCTION: NORMAL
MDC_IDC_MSMT_BATTERY_DTM: NORMAL
MDC_IDC_MSMT_BATTERY_REMAINING_PERCENTAGE: 22 %
MDC_IDC_MSMT_BATTERY_STATUS: NORMAL
MDC_IDC_PG_IMPLANT_DTM: NORMAL
MDC_IDC_PG_MFG: NORMAL
MDC_IDC_PG_MODEL: NORMAL
MDC_IDC_PG_SERIAL: NORMAL
MDC_IDC_PG_TYPE: NORMAL
MDC_IDC_SESS_CLINIC_NAME: NORMAL
MDC_IDC_SESS_DTM: NORMAL
MDC_IDC_SESS_TYPE: NORMAL
MDC_IDC_SET_ZONE_DETECTION_INTERVAL: 240 MS
MDC_IDC_SET_ZONE_DETECTION_INTERVAL: 273 MS
MDC_IDC_SET_ZONE_TYPE: NORMAL
MDC_IDC_SET_ZONE_TYPE: NORMAL
MDC_IDC_SET_ZONE_VENDOR_TYPE: NORMAL
MDC_IDC_SET_ZONE_VENDOR_TYPE: NORMAL
MDC_IDC_STAT_AT_BURDEN_PERCENT: 0 %
MDC_IDC_STAT_EPISODE_RECENT_COUNT: 0
MDC_IDC_STAT_EPISODE_RECENT_COUNT_DTM_END: NORMAL
MDC_IDC_STAT_EPISODE_RECENT_COUNT_DTM_START: NORMAL
MDC_IDC_STAT_EPISODE_TOTAL_COUNT: 0
MDC_IDC_STAT_EPISODE_TOTAL_COUNT: 0
MDC_IDC_STAT_EPISODE_TOTAL_COUNT_DTM_END: NORMAL
MDC_IDC_STAT_EPISODE_TOTAL_COUNT_DTM_END: NORMAL
MDC_IDC_STAT_EPISODE_TOTAL_COUNT_DTM_START: NORMAL
MDC_IDC_STAT_EPISODE_TOTAL_COUNT_DTM_START: NORMAL
MDC_IDC_STAT_EPISODE_TYPE: NORMAL
MDC_IDC_STAT_EPISODE_VENDOR_TYPE: NORMAL
MDC_IDC_STAT_TACHYTHERAPY_RECENT_DTM_END: NORMAL
MDC_IDC_STAT_TACHYTHERAPY_RECENT_DTM_START: NORMAL
MDC_IDC_STAT_TACHYTHERAPY_SHOCKS_DELIVERED_RECENT: 0
MDC_IDC_STAT_TACHYTHERAPY_SHOCKS_DELIVERED_TOTAL: 1
MDC_IDC_STAT_TACHYTHERAPY_TOTAL_DTM_END: NORMAL
MDC_IDC_STAT_TACHYTHERAPY_TOTAL_DTM_START: NORMAL

## 2022-08-01 ENCOUNTER — MYC MEDICAL ADVICE (OUTPATIENT)
Dept: CARDIOLOGY | Facility: CLINIC | Age: 52
End: 2022-08-01

## 2022-08-09 ENCOUNTER — ANCILLARY PROCEDURE (OUTPATIENT)
Dept: CARDIOLOGY | Facility: CLINIC | Age: 52
End: 2022-08-09
Attending: INTERNAL MEDICINE
Payer: COMMERCIAL

## 2022-08-09 DIAGNOSIS — I46.9 CARDIAC ARREST (H): ICD-10-CM

## 2022-08-09 DIAGNOSIS — Z95.810 AICD (AUTOMATIC CARDIOVERTER/DEFIBRILLATOR) PRESENT: ICD-10-CM

## 2022-08-09 PROCEDURE — 93295 DEV INTERROG REMOTE 1/2/MLT: CPT | Performed by: INTERNAL MEDICINE

## 2022-08-09 PROCEDURE — 93296 REM INTERROG EVL PM/IDS: CPT

## 2022-08-11 LAB
MDC_IDC_LEAD_IMPLANT_DT: NORMAL
MDC_IDC_LEAD_LOCATION: NORMAL
MDC_IDC_LEAD_LOCATION_DETAIL_1: NORMAL
MDC_IDC_LEAD_MFG: NORMAL
MDC_IDC_LEAD_MODEL: NORMAL
MDC_IDC_LEAD_POLARITY_TYPE: NORMAL
MDC_IDC_LEAD_SERIAL: NORMAL
MDC_IDC_LEAD_SPECIAL_FUNCTION: NORMAL
MDC_IDC_MSMT_BATTERY_DTM: NORMAL
MDC_IDC_MSMT_BATTERY_REMAINING_PERCENTAGE: 19 %
MDC_IDC_MSMT_BATTERY_STATUS: NORMAL
MDC_IDC_PG_IMPLANT_DTM: NORMAL
MDC_IDC_PG_MFG: NORMAL
MDC_IDC_PG_MODEL: NORMAL
MDC_IDC_PG_SERIAL: NORMAL
MDC_IDC_PG_TYPE: NORMAL
MDC_IDC_SESS_CLINIC_NAME: NORMAL
MDC_IDC_SESS_DTM: NORMAL
MDC_IDC_SESS_TYPE: NORMAL
MDC_IDC_SET_ZONE_DETECTION_INTERVAL: 240 MS
MDC_IDC_SET_ZONE_DETECTION_INTERVAL: 273 MS
MDC_IDC_SET_ZONE_TYPE: NORMAL
MDC_IDC_SET_ZONE_TYPE: NORMAL
MDC_IDC_SET_ZONE_VENDOR_TYPE: NORMAL
MDC_IDC_SET_ZONE_VENDOR_TYPE: NORMAL
MDC_IDC_STAT_EPISODE_RECENT_COUNT: 0
MDC_IDC_STAT_EPISODE_RECENT_COUNT_DTM_END: NORMAL
MDC_IDC_STAT_EPISODE_RECENT_COUNT_DTM_START: NORMAL
MDC_IDC_STAT_EPISODE_TOTAL_COUNT: 0
MDC_IDC_STAT_EPISODE_TOTAL_COUNT: 0
MDC_IDC_STAT_EPISODE_TOTAL_COUNT_DTM_END: NORMAL
MDC_IDC_STAT_EPISODE_TOTAL_COUNT_DTM_END: NORMAL
MDC_IDC_STAT_EPISODE_TOTAL_COUNT_DTM_START: NORMAL
MDC_IDC_STAT_EPISODE_TOTAL_COUNT_DTM_START: NORMAL
MDC_IDC_STAT_EPISODE_TYPE: NORMAL
MDC_IDC_STAT_EPISODE_VENDOR_TYPE: NORMAL
MDC_IDC_STAT_TACHYTHERAPY_RECENT_DTM_END: NORMAL
MDC_IDC_STAT_TACHYTHERAPY_RECENT_DTM_START: NORMAL
MDC_IDC_STAT_TACHYTHERAPY_SHOCKS_DELIVERED_RECENT: 0
MDC_IDC_STAT_TACHYTHERAPY_SHOCKS_DELIVERED_TOTAL: 1
MDC_IDC_STAT_TACHYTHERAPY_TOTAL_DTM_END: NORMAL
MDC_IDC_STAT_TACHYTHERAPY_TOTAL_DTM_START: NORMAL

## 2022-10-23 ENCOUNTER — APPOINTMENT (OUTPATIENT)
Dept: GENERAL RADIOLOGY | Facility: CLINIC | Age: 52
End: 2022-10-23
Attending: EMERGENCY MEDICINE
Payer: COMMERCIAL

## 2022-10-23 ENCOUNTER — HOSPITAL ENCOUNTER (EMERGENCY)
Facility: CLINIC | Age: 52
Discharge: HOME | End: 2022-10-23
Attending: EMERGENCY MEDICINE | Admitting: EMERGENCY MEDICINE
Payer: COMMERCIAL

## 2022-10-23 ENCOUNTER — HEALTH MAINTENANCE LETTER (OUTPATIENT)
Age: 52
End: 2022-10-23

## 2022-10-23 VITALS
HEART RATE: 66 BPM | WEIGHT: 180 LBS | TEMPERATURE: 98.8 F | BODY MASS INDEX: 29.99 KG/M2 | SYSTOLIC BLOOD PRESSURE: 135 MMHG | DIASTOLIC BLOOD PRESSURE: 84 MMHG | HEIGHT: 65 IN | RESPIRATION RATE: 18 BRPM | OXYGEN SATURATION: 96 %

## 2022-10-23 DIAGNOSIS — R07.9 ACUTE CHEST PAIN: ICD-10-CM

## 2022-10-23 DIAGNOSIS — Z53.29 LEFT AGAINST MEDICAL ADVICE: ICD-10-CM

## 2022-10-23 LAB
ALBUMIN SERPL BCG-MCNC: 4 G/DL (ref 3.5–5.2)
ALP SERPL-CCNC: 96 U/L (ref 35–104)
ALT SERPL W P-5'-P-CCNC: 18 U/L (ref 10–35)
ANION GAP SERPL CALCULATED.3IONS-SCNC: 10 MMOL/L (ref 7–15)
AST SERPL W P-5'-P-CCNC: 22 U/L (ref 10–35)
BASOPHILS # BLD AUTO: 0 10E3/UL (ref 0–0.2)
BASOPHILS NFR BLD AUTO: 0 %
BILIRUB SERPL-MCNC: 0.2 MG/DL
BUN SERPL-MCNC: 15 MG/DL (ref 6–20)
CALCIUM SERPL-MCNC: 9.1 MG/DL (ref 8.6–10)
CHLORIDE SERPL-SCNC: 103 MMOL/L (ref 98–107)
CREAT SERPL-MCNC: 1.02 MG/DL (ref 0.51–0.95)
DEPRECATED HCO3 PLAS-SCNC: 24 MMOL/L (ref 22–29)
EOSINOPHIL # BLD AUTO: 0.1 10E3/UL (ref 0–0.7)
EOSINOPHIL NFR BLD AUTO: 1 %
ERYTHROCYTE [DISTWIDTH] IN BLOOD BY AUTOMATED COUNT: 13.2 % (ref 10–15)
GFR SERPL CREATININE-BSD FRML MDRD: 65 ML/MIN/1.73M2
GLUCOSE SERPL-MCNC: 103 MG/DL (ref 70–99)
HCT VFR BLD AUTO: 41.8 % (ref 35–47)
HGB BLD-MCNC: 13.9 G/DL (ref 11.7–15.7)
HOLD SPECIMEN: NORMAL
IMM GRANULOCYTES # BLD: 0 10E3/UL
IMM GRANULOCYTES NFR BLD: 0 %
LYMPHOCYTES # BLD AUTO: 2.2 10E3/UL (ref 0.8–5.3)
LYMPHOCYTES NFR BLD AUTO: 29 %
MCH RBC QN AUTO: 30.5 PG (ref 26.5–33)
MCHC RBC AUTO-ENTMCNC: 33.3 G/DL (ref 31.5–36.5)
MCV RBC AUTO: 92 FL (ref 78–100)
MONOCYTES # BLD AUTO: 0.6 10E3/UL (ref 0–1.3)
MONOCYTES NFR BLD AUTO: 8 %
NEUTROPHILS # BLD AUTO: 4.8 10E3/UL (ref 1.6–8.3)
NEUTROPHILS NFR BLD AUTO: 62 %
NRBC # BLD AUTO: 0 10E3/UL
NRBC BLD AUTO-RTO: 0 /100
PLATELET # BLD AUTO: 209 10E3/UL (ref 150–450)
POTASSIUM SERPL-SCNC: 3.9 MMOL/L (ref 3.4–5.3)
PROT SERPL-MCNC: 6.3 G/DL (ref 6.4–8.3)
RBC # BLD AUTO: 4.55 10E6/UL (ref 3.8–5.2)
SODIUM SERPL-SCNC: 137 MMOL/L (ref 136–145)
TROPONIN T SERPL HS-MCNC: 13 NG/L
WBC # BLD AUTO: 7.7 10E3/UL (ref 4–11)

## 2022-10-23 PROCEDURE — 93010 ELECTROCARDIOGRAM REPORT: CPT | Performed by: EMERGENCY MEDICINE

## 2022-10-23 PROCEDURE — 84484 ASSAY OF TROPONIN QUANT: CPT | Performed by: EMERGENCY MEDICINE

## 2022-10-23 PROCEDURE — 71046 X-RAY EXAM CHEST 2 VIEWS: CPT

## 2022-10-23 PROCEDURE — 71046 X-RAY EXAM CHEST 2 VIEWS: CPT | Mod: 26 | Performed by: RADIOLOGY

## 2022-10-23 PROCEDURE — 36415 COLL VENOUS BLD VENIPUNCTURE: CPT | Performed by: EMERGENCY MEDICINE

## 2022-10-23 PROCEDURE — 99285 EMERGENCY DEPT VISIT HI MDM: CPT | Mod: 25 | Performed by: EMERGENCY MEDICINE

## 2022-10-23 PROCEDURE — 93005 ELECTROCARDIOGRAM TRACING: CPT | Performed by: EMERGENCY MEDICINE

## 2022-10-23 PROCEDURE — 80053 COMPREHEN METABOLIC PANEL: CPT | Performed by: EMERGENCY MEDICINE

## 2022-10-23 PROCEDURE — 85004 AUTOMATED DIFF WBC COUNT: CPT | Performed by: EMERGENCY MEDICINE

## 2022-10-23 ASSESSMENT — ACTIVITIES OF DAILY LIVING (ADL)
ADLS_ACUITY_SCORE: 35
ADLS_ACUITY_SCORE: 35

## 2022-10-23 NOTE — ED NOTES
Writer  made several attempts to interrogate cardiac device.  Patient cardiac device is [employer]. Unit was unable t connect and load patient device information due to connect. Writer  and other staff tried to connected but still unsuccessful. Writer update MD. Patient later request to leave AMA. MD rounded, awaiting labs and EKG results. NIA Son

## 2022-10-23 NOTE — ED TRIAGE NOTES
Pt having CP, radiating into jaw, sweaty, pain in back, N/V indigestion.  MI in 2015, has defibrillator.

## 2022-10-23 NOTE — ED PROVIDER NOTES
History     Chief Complaint   Patient presents with     Chest Pain     HPI  Lauren Lewis is a 52 year old female with a past medical history of hypertrophic obstructive cardiomyopathy, AICD in place, prior cardiac arrest (2015), prior MI, hypothyroidism/Graves' disease, liver laceration [facility] who presents to the emergency department with a chief complaint of chest pain.  Radiates into bilateral jaw and between shoulder blades.  Started at 1430 today.  Also associated with diaphoresis, as well as nausea.  Patient has not had symptoms like this since her last cardiac event.  No shocks noted from her defibrillator.  No other associated symptoms.  The patient thinks that she just has some indigestion, does not believe this is related to her heart.    I have reviewed the Medications, Allergies, Past Medical and Surgical History, and Social History in the Epic system.    Echocardiogram 1/28/2022  Interpretation Summary  Global and regional left ventricular function is normal with an EF of 55-60%.  Left ventricular wall thickness is normal.  Global right ventricular function is normal.  Mild right ventricular dilation is present.     This study was compared with the study from 12/6/2019 .There has been no  change.    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     Current Outpatient Medications   Medication     escitalopram (LEXAPRO) 10 MG tablet     levothyroxine (SYNTHROID) 100 MCG tablet     metoprolol succinate ER (TOPROL-XL) 100 MG 24 hr tablet     OMEPRAZOLE     simvastatin (ZOCOR) 20 MG tablet     spironolactone (ALDACTONE) 25 MG tablet     vitamin C (ASCORBIC ACID) 100 MG tablet     VITAMIN D, CHOLECALCIFEROL, PO     zinc gluconate 50 MG tablet     No Known Allergies  Past medical history, past surgical history, medications, and allergies were reviewed with the patient. Additional pertinent items: None    Social History     Socioeconomic History     Marital status:      Spouse name: Not on file     Number of children: Not on file     Years of education: Not on file     Highest education level: Not on file   Occupational History     Not on file   Tobacco Use     Smoking status: Never     Smokeless tobacco: Never   Substance and Sexual Activity     Alcohol use: No     Drug use: No     Sexual activity: Yes     Partners: Male   Other Topics Concern     Parent/sibling w/ CABG, MI or angioplasty before 65F 55M? Not Asked   Social History Narrative     Not on file     Social Determinants of Health     Financial Resource Strain: Not on file   Food Insecurity: Not on file   Transportation Needs: Not on file   Physical Activity: Not on file   Stress: Not on file   Social Connections: Not on file   Intimate Partner Violence: Not on file   Housing Stability: Not on file     Social history was reviewed with the patient. Additional pertinent items: None    Review of Systems  General: No fevers or chills  Skin: No rash, positive for diaphoresis  Eyes: No eye redness or discharge  Ears/Nose/Throat: No rhinorrhea or nasal congestion  Respiratory: No cough or  "SOB  Cardiovascular: Positive for chest pain, no palpitations  Gastrointestinal: Positive for nausea and \"indigestion\"  Genitourinary: No urinary frequency, hematuria, or dysuria  Musculoskeletal: Positive for pain radiating to bilateral jaw and back  Neurologic: No numbness or weakness  Hematologic/Lymphatic/Immunologic: No leg swelling, no easy bruising/bleeding  Endocrine: No polyuria/polydypsia    A complete review of systems was performed with pertinent positives and negatives noted in the HPI, and all other systems negative.    Physical Exam   BP: 135/84  Pulse: 66  Temp: 98.8  F (37.1  C)  Resp: 18  Height: 165.1 cm (5' 5\")  Weight: 81.6 kg (180 lb)  SpO2: 96 %      General: Well nourished, well developed, NAD  HEENT: EOMI, anicteric. NCAT, MMM  Neck: no jugular venous distension, supple, nl ROM  Cardiac: Regular rate and rhythm. No murmurs, rubs, or gallops. Normal S1, S2.  Intact peripheral pulses  Pulm: CTAB, no stridor, wheezes, rales, rhonchi    Skin: Warm and dry to the touch.  No rash  Extremities: No LE edema, no cyanosis, w/w/p  Neuro: A&Ox3, no gross focal deficits    ED Course        Procedures               EKG Interpretation:      Interpreted by Audelia Christopher MD  Time reviewed: 1635  Symptoms at time of EKG: Chest pain  Rhythm: normal sinus   Rate: normal, 67 bpm  Axis: NORMAL  Ectopy: none  Conduction: normal  ST Segments/ T Waves: Nonspecific ST/T wave abnormality no ST-T wave changes  Q Waves: none  Comparison to prior: Compared to previous EKG, dated January 28, 2022, no significant acute changes    Clinical Impression: abnormal EKG                  Labs Ordered and Resulted from Time of ED Arrival to Time of ED Departure   COMPREHENSIVE METABOLIC PANEL - Abnormal       Result Value    Sodium 137      Potassium 3.9      Chloride 103      Carbon Dioxide (CO2) 24      Anion Gap 10      Urea Nitrogen 15.0      Creatinine 1.02 (*)     Calcium 9.1      Glucose 103 (*)     Alkaline " Phosphatase 96      AST 22      ALT 18      Protein Total 6.3 (*)     Albumin 4.0      Bilirubin Total 0.2      GFR Estimate 65     TROPONIN T, HIGH SENSITIVITY - Normal    Troponin T, High Sensitivity 13     CBC WITH PLATELETS AND DIFFERENTIAL    WBC Count 7.7      RBC Count 4.55      Hemoglobin 13.9      Hematocrit 41.8      MCV 92      MCH 30.5      MCHC 33.3      RDW 13.2      Platelet Count 209      % Neutrophils 62      % Lymphocytes 29      % Monocytes 8      % Eosinophils 1      % Basophils 0      % Immature Granulocytes 0      NRBCs per 100 WBC 0      Absolute Neutrophils 4.8      Absolute Lymphocytes 2.2      Absolute Monocytes 0.6      Absolute Eosinophils 0.1      Absolute Basophils 0.0      Absolute Immature Granulocytes 0.0      Absolute NRBCs 0.0     COMPREHENSIVE METABOLIC PANEL   TROPONIN T, HIGH SENSITIVITY   CBC WITH PLATELETS AND DIFFERENTIAL            Results for orders placed or performed during the hospital encounter of 10/23/22 (from the past 24 hour(s))   [facility] Draw    Narrative    The following orders were created for panel order [facility] Draw.  Procedure                               Abnormality         Status                     ---------                               -----------         ------                     Extra Blue Top Tube[[id number]]                              Final result               Extra Red Top Tube[[id number]]                               Final result               Extra Green Top (Lithium...[[id number]]                      Final result               Extra Purple Top Tube[[id number]]                            Final result                 Please view results for these tests on the individual orders.   Extra Blue Top Tube   Result Value Ref Range    Hold Specimen [facility]    Extra Red Top Tube   Result Value Ref Range    Hold Specimen [facility]    Extra Green Top (Lithium Heparin) Tube   Result Value Ref Range    Hold Specimen [facility]    Extra Purple Top Tube   Result Value Ref Range     Hold Specimen [facility]    EKG 12 lead   Result Value Ref Range    Systolic Blood Pressure  mmHg    Diastolic Blood Pressure  mmHg    Ventricular Rate 67 BPM    Atrial Rate 67 BPM    VA Interval 170 ms    QRS Duration 80 ms     ms    QTc 397 ms    P Axis 50 degrees    R AXIS -12 degrees    T Axis -2 degrees    Interpretation ECG       Sinus rhythm  Nonspecific ST and T wave abnormality  Abnormal ECG     CBC with platelets differential    Narrative    The following orders were created for panel order CBC with platelets differential.  Procedure                               Abnormality         Status                     ---------                               -----------         ------                     CBC with platelets and d...[[id number]]                                                   Please view results for these tests on the individual orders.   XR Chest 2 Views    Narrative    Exam: XR CHEST 2 VIEWS, 10/23/2022 4:27 PM    Indication: chest pain    Comparison: 10/27/2015    Findings:   Left chest wall implantable cardiac defibrillator lead is intact and  in stable position. The cardiomediastinal silhouette and pulmonary  vasculature are within normal limits. No pleural effusion or  pneumothorax. No focal airspace opacity.      Impression    Impression: No acute cardiopulmonary abnormality.    NATHANAEL NIXON DO         SYSTEM ID:  [id number]   CBC with platelets differential *Canceled*    Narrative    The following orders were created for panel order CBC with platelets differential.  Procedure                               Abnormality         Status                     ---------                               -----------         ------                       Please view results for these tests on the individual orders.   CBC with platelets differential    Narrative    The following orders were created for panel order CBC with platelets differential.  Procedure                               Abnormality          Status                     ---------                               -----------         ------                     CBC with platelets and d...[[id number]]                      Final result                 Please view results for these tests on the individual orders.   Comprehensive metabolic panel   Result Value Ref Range    Sodium 137 136 - 145 mmol/L    Potassium 3.9 3.4 - 5.3 mmol/L    Chloride 103 98 - 107 mmol/L    Carbon Dioxide (CO2) 24 22 - 29 mmol/L    Anion Gap 10 7 - 15 mmol/L    Urea Nitrogen 15.0 6.0 - 20.0 mg/dL    Creatinine 1.02 (H) 0.51 - 0.95 mg/dL    Calcium 9.1 8.6 - 10.0 mg/dL    Glucose 103 (H) 70 - 99 mg/dL    Alkaline Phosphatase 96 35 - 104 U/L    AST 22 10 - 35 U/L    ALT 18 10 - 35 U/L    Protein Total 6.3 (L) 6.4 - 8.3 g/dL    Albumin 4.0 3.5 - 5.2 g/dL    Bilirubin Total 0.2 <=1.2 mg/dL    GFR Estimate 65 >60 mL/min/1.73m2   Troponin T, High Sensitivity   Result Value Ref Range    Troponin T, High Sensitivity 13 <=14 ng/L   CBC with platelets and differential   Result Value Ref Range    WBC Count 7.7 4.0 - 11.0 10e3/uL    RBC Count 4.55 3.80 - 5.20 10e6/uL    Hemoglobin 13.9 11.7 - 15.7 g/dL    Hematocrit 41.8 35.0 - 47.0 %    MCV 92 78 - 100 fL    MCH 30.5 26.5 - 33.0 pg    MCHC 33.3 31.5 - 36.5 g/dL    RDW 13.2 10.0 - 15.0 %    Platelet Count 209 150 - 450 10e3/uL    % Neutrophils 62 %    % Lymphocytes 29 %    % Monocytes 8 %    % Eosinophils 1 %    % Basophils 0 %    % Immature Granulocytes 0 %    NRBCs per 100 WBC 0 <1 /100    Absolute Neutrophils 4.8 1.6 - 8.3 10e3/uL    Absolute Lymphocytes 2.2 0.8 - 5.3 10e3/uL    Absolute Monocytes 0.6 0.0 - 1.3 10e3/uL    Absolute Eosinophils 0.1 0.0 - 0.7 10e3/uL    Absolute Basophils 0.0 0.0 - 0.2 10e3/uL    Absolute Immature Granulocytes 0.0 <=0.4 10e3/uL    Absolute NRBCs 0.0 10e3/uL       Labs, vital signs, and imaging studies were reviewed by me.    Medications - No data to display    Assessments & Plan (with Medical Decision Making)   Lauren  Vee Lewis is a 52 year old female who presents to the emergency department with chest pain.  Differential diagnosis includes ACS, musculoskeletal chest wall pain, pneumonia, bronchitis, influenza, covid-19 infection, other viral syndrome, anxiety.  Labs, EKG, chest x-ray ordered to further evaluate patient.    EKG shows no significant changes    Patient states that she would like to leave AGAINST MEDICAL ADVICE.  The risks of leaving AGAINST MEDICAL ADVICE were discussed with the patient including, but not limited to, life threatening arrhythmia, cardiac arrest, cardiac ischemia, development of CHF, permanent disability or death.  Patient expressed understanding of these risks and would still like to leave AGAINST MEDICAL ADVICE, however, she is willing to stay for lab results.    Chest x-ray shows NAD    Contacted lab due to delay in results of patient's blood work.  They state they do not have samples there despite blood having been collected and sent in the emergency department.  Will recollect sample.     Patient's laboratory workup is remarkable for normal troponin.  I did discuss with the patient that negative initial ER work-up, though reassuring, does not adequately rule out acute cardiac event.  I did discuss with patient that if she is not willing to stay for full recommended work-up (I discussed with her that I would recommend admission to the hospital and cardiology consultation for further cardiac evaluation), I would still strongly recommend she stay for a second troponin test.  However, the patient did not want to stay for this test.    I have reviewed the nursing notes.    I have reviewed the findings, diagnosis, plan and need for follow up with the patient.    Patient would like to leave AGAINST MEDICAL ADVICE.  The risks of leaving AGAINST MEDICAL ADVICE were discussed with the patient including, but not limited to, permanent disability or death.  Patient expressed understanding of these risks  and would still like to leave AGAINST MEDICAL ADVICE.  Patient to sign AMA form.  Patient encouraged to follow-up as soon as possible with PCP/cardiology and it was discussed with patient that they may return to the emergency department at any time for reevaluation if they wish to do so despite leaving AGAINST MEDICAL ADVICE and they are encouraged to do so if they develop any new or worsening symptoms.      Discharge Medication List as of 10/23/2022  6:55 PM          Final diagnoses:   Acute chest pain   Left against medical advice     Audelia Christopher MD  10/23/2022   [facility] EMERGENCY DEPARTMENT     Audelia Christopher MD  10/23/22 2019

## 2022-10-23 NOTE — ED NOTES
"ED Triage Provider Note  [facility]  Encounter Date: Oct 23, 2022    History:  Chief Complaint   Patient presents with     Chest Pain     Lauren Lewis is a 52 year old female who presents to the ED with chest pain.  Patient notes chest pain, bilateral jaw pain and pain between her shoulder blades that started at 1430 today.  She also notes diaphoresis.  Has not had similar occurrence in the past.  Patient does have a known history of HOCM with previous cardiac arrest in 2015 and has defibrillator.  She has not had any shocks from her defibrillator.  No other symptoms noted.    Review of Systems:  Chest pain    Exam:  /84   Pulse 66   Temp 98.8  F (37.1  C) (Oral)   Resp 18   Ht 1.651 m (5' 5\")   Wt 81.6 kg (180 lb)   SpO2 96%   BMI 29.95 kg/m    General: No acute distress. Appears stated age.   Cardio: Regular rate, extremities well perfused  Resp: Normal work of breathing, grossly normal respiratory rate  Neuro: Alert. CN II-XII grossly intact. Grossly intact strength.       Medical Decision Making:  Patient arriving to the ED with problem as above. A medical screening exam was performed.  Labs, ECG and x-ray orders initiated from Triage. The patient is appropriate to be immediately roomed.       Mic Carlson, DO on 10/23/2022 at 3:51 PM     Mic Carlson, DO  10/23/22 1552    "

## 2022-10-23 NOTE — DISCHARGE INSTRUCTIONS
TODAY'S VISIT:  You were seen today for chest pain     You understand that you are leaving against medical advice. The risks of leaving AMA without further evaluation include, but are not limited to, permanent disability or death. You are welcome to return to the ER for further evaluation at any time despite leaving against medical advice and are encouraged to do so if your symptoms worsen or you develop new symptoms.   -   - If you had any labs or imaging/radiology tests performed today, you should also discuss these tests with your usual provider.     FOLLOW-UP:  Please make an appointment to follow up with:  - Your Primary Care Provider. If you do not have a PCP, please call the Primary Care Center (phone: [phone] for an appointment  - Cardiology Clinic (phone: [phone]) AS SOON AS POSSIBLE    - Have your provider review the results from today's visit with you again to make sure no further follow-up or additional testing is needed based on those results.     RETURN TO THE EMERGENCY DEPARTMENT  Return to the Emergency Department at any time for any new or worsening symptoms or any concerns.

## 2022-10-24 LAB
ATRIAL RATE - MUSE: 67 BPM
DIASTOLIC BLOOD PRESSURE - MUSE: NORMAL MMHG
INTERPRETATION ECG - MUSE: NORMAL
P AXIS - MUSE: 50 DEGREES
PR INTERVAL - MUSE: 170 MS
QRS DURATION - MUSE: 80 MS
QT - MUSE: 376 MS
QTC - MUSE: 397 MS
R AXIS - MUSE: -12 DEGREES
SYSTOLIC BLOOD PRESSURE - MUSE: NORMAL MMHG
T AXIS - MUSE: -2 DEGREES
VENTRICULAR RATE- MUSE: 67 BPM

## 2022-11-15 ENCOUNTER — ANCILLARY PROCEDURE (OUTPATIENT)
Dept: CARDIOLOGY | Facility: CLINIC | Age: 52
End: 2022-11-15
Attending: INTERNAL MEDICINE
Payer: COMMERCIAL

## 2022-11-15 DIAGNOSIS — I46.9 CARDIAC ARREST (H): ICD-10-CM

## 2022-11-15 DIAGNOSIS — Z95.810 AICD (AUTOMATIC CARDIOVERTER/DEFIBRILLATOR) PRESENT: ICD-10-CM

## 2022-11-15 PROCEDURE — 93296 REM INTERROG EVL PM/IDS: CPT

## 2022-11-15 PROCEDURE — 93295 DEV INTERROG REMOTE 1/2/MLT: CPT | Performed by: INTERNAL MEDICINE

## 2023-01-12 LAB
MDC_IDC_LEAD_IMPLANT_DT: NORMAL
MDC_IDC_LEAD_LOCATION: NORMAL
MDC_IDC_LEAD_LOCATION_DETAIL_1: NORMAL
MDC_IDC_LEAD_MFG: NORMAL
MDC_IDC_LEAD_MODEL: NORMAL
MDC_IDC_LEAD_POLARITY_TYPE: NORMAL
MDC_IDC_LEAD_SERIAL: NORMAL
MDC_IDC_LEAD_SPECIAL_FUNCTION: NORMAL
MDC_IDC_MSMT_BATTERY_DTM: NORMAL
MDC_IDC_MSMT_BATTERY_REMAINING_PERCENTAGE: 17 %
MDC_IDC_MSMT_BATTERY_STATUS: NORMAL
MDC_IDC_PG_IMPLANT_DTM: NORMAL
MDC_IDC_PG_MFG: NORMAL
MDC_IDC_PG_MODEL: NORMAL
MDC_IDC_PG_SERIAL: NORMAL
MDC_IDC_PG_TYPE: NORMAL
MDC_IDC_SESS_CLINIC_NAME: NORMAL
MDC_IDC_SESS_DTM: NORMAL
MDC_IDC_SESS_TYPE: NORMAL
MDC_IDC_SET_ZONE_DETECTION_INTERVAL: 240 MS
MDC_IDC_SET_ZONE_DETECTION_INTERVAL: 273 MS
MDC_IDC_SET_ZONE_TYPE: NORMAL
MDC_IDC_SET_ZONE_TYPE: NORMAL
MDC_IDC_SET_ZONE_VENDOR_TYPE: NORMAL
MDC_IDC_SET_ZONE_VENDOR_TYPE: NORMAL
MDC_IDC_STAT_EPISODE_RECENT_COUNT: 0
MDC_IDC_STAT_EPISODE_RECENT_COUNT_DTM_END: NORMAL
MDC_IDC_STAT_EPISODE_RECENT_COUNT_DTM_START: NORMAL
MDC_IDC_STAT_EPISODE_TOTAL_COUNT: 0
MDC_IDC_STAT_EPISODE_TOTAL_COUNT: 0
MDC_IDC_STAT_EPISODE_TOTAL_COUNT_DTM_END: NORMAL
MDC_IDC_STAT_EPISODE_TOTAL_COUNT_DTM_END: NORMAL
MDC_IDC_STAT_EPISODE_TOTAL_COUNT_DTM_START: NORMAL
MDC_IDC_STAT_EPISODE_TOTAL_COUNT_DTM_START: NORMAL
MDC_IDC_STAT_EPISODE_TYPE: NORMAL
MDC_IDC_STAT_EPISODE_VENDOR_TYPE: NORMAL
MDC_IDC_STAT_TACHYTHERAPY_RECENT_DTM_END: NORMAL
MDC_IDC_STAT_TACHYTHERAPY_RECENT_DTM_START: NORMAL
MDC_IDC_STAT_TACHYTHERAPY_SHOCKS_DELIVERED_RECENT: 0
MDC_IDC_STAT_TACHYTHERAPY_SHOCKS_DELIVERED_TOTAL: 1
MDC_IDC_STAT_TACHYTHERAPY_TOTAL_DTM_END: NORMAL
MDC_IDC_STAT_TACHYTHERAPY_TOTAL_DTM_START: NORMAL

## 2023-02-21 ENCOUNTER — ANCILLARY PROCEDURE (OUTPATIENT)
Dept: CARDIOLOGY | Facility: CLINIC | Age: 53
End: 2023-02-21
Attending: INTERNAL MEDICINE
Payer: COMMERCIAL

## 2023-02-21 DIAGNOSIS — I46.9 CARDIAC ARREST (H): ICD-10-CM

## 2023-02-21 DIAGNOSIS — Z95.810 AICD (AUTOMATIC CARDIOVERTER/DEFIBRILLATOR) PRESENT: ICD-10-CM

## 2023-02-21 PROCEDURE — 93295 DEV INTERROG REMOTE 1/2/MLT: CPT | Performed by: INTERNAL MEDICINE

## 2023-02-21 PROCEDURE — 93296 REM INTERROG EVL PM/IDS: CPT

## 2023-02-26 LAB
MDC_IDC_LEAD_IMPLANT_DT: NORMAL
MDC_IDC_LEAD_LOCATION: NORMAL
MDC_IDC_LEAD_LOCATION_DETAIL_1: NORMAL
MDC_IDC_LEAD_MFG: NORMAL
MDC_IDC_LEAD_MODEL: NORMAL
MDC_IDC_LEAD_POLARITY_TYPE: NORMAL
MDC_IDC_LEAD_SERIAL: NORMAL
MDC_IDC_LEAD_SPECIAL_FUNCTION: NORMAL
MDC_IDC_MSMT_BATTERY_DTM: NORMAL
MDC_IDC_MSMT_BATTERY_REMAINING_PERCENTAGE: 16 %
MDC_IDC_MSMT_BATTERY_STATUS: NORMAL
MDC_IDC_PG_IMPLANT_DTM: NORMAL
MDC_IDC_PG_MFG: NORMAL
MDC_IDC_PG_MODEL: NORMAL
MDC_IDC_PG_SERIAL: NORMAL
MDC_IDC_PG_TYPE: NORMAL
MDC_IDC_SESS_CLINIC_NAME: NORMAL
MDC_IDC_SESS_DTM: NORMAL
MDC_IDC_SESS_TYPE: NORMAL
MDC_IDC_SET_ZONE_DETECTION_INTERVAL: 240 MS
MDC_IDC_SET_ZONE_DETECTION_INTERVAL: 273 MS
MDC_IDC_SET_ZONE_TYPE: NORMAL
MDC_IDC_SET_ZONE_TYPE: NORMAL
MDC_IDC_SET_ZONE_VENDOR_TYPE: NORMAL
MDC_IDC_SET_ZONE_VENDOR_TYPE: NORMAL
MDC_IDC_STAT_EPISODE_RECENT_COUNT: 0
MDC_IDC_STAT_EPISODE_RECENT_COUNT_DTM_END: NORMAL
MDC_IDC_STAT_EPISODE_RECENT_COUNT_DTM_START: NORMAL
MDC_IDC_STAT_EPISODE_TOTAL_COUNT: 0
MDC_IDC_STAT_EPISODE_TOTAL_COUNT: 0
MDC_IDC_STAT_EPISODE_TOTAL_COUNT_DTM_END: NORMAL
MDC_IDC_STAT_EPISODE_TOTAL_COUNT_DTM_END: NORMAL
MDC_IDC_STAT_EPISODE_TOTAL_COUNT_DTM_START: NORMAL
MDC_IDC_STAT_EPISODE_TOTAL_COUNT_DTM_START: NORMAL
MDC_IDC_STAT_EPISODE_TYPE: NORMAL
MDC_IDC_STAT_EPISODE_VENDOR_TYPE: NORMAL
MDC_IDC_STAT_TACHYTHERAPY_RECENT_DTM_END: NORMAL
MDC_IDC_STAT_TACHYTHERAPY_RECENT_DTM_START: NORMAL
MDC_IDC_STAT_TACHYTHERAPY_SHOCKS_DELIVERED_RECENT: 0
MDC_IDC_STAT_TACHYTHERAPY_SHOCKS_DELIVERED_TOTAL: 1
MDC_IDC_STAT_TACHYTHERAPY_TOTAL_DTM_END: NORMAL
MDC_IDC_STAT_TACHYTHERAPY_TOTAL_DTM_START: NORMAL

## 2023-05-23 ENCOUNTER — ANCILLARY PROCEDURE (OUTPATIENT)
Dept: CARDIOLOGY | Facility: CLINIC | Age: 53
End: 2023-05-23
Attending: INTERNAL MEDICINE
Payer: COMMERCIAL

## 2023-05-23 DIAGNOSIS — Z95.810 AICD (AUTOMATIC CARDIOVERTER/DEFIBRILLATOR) PRESENT: ICD-10-CM

## 2023-05-23 DIAGNOSIS — I46.9 CARDIAC ARREST (H): ICD-10-CM

## 2023-05-23 PROCEDURE — 93295 DEV INTERROG REMOTE 1/2/MLT: CPT | Performed by: INTERNAL MEDICINE

## 2023-05-23 PROCEDURE — 93296 REM INTERROG EVL PM/IDS: CPT

## 2023-06-03 LAB
MDC_IDC_LEAD_IMPLANT_DT: NORMAL
MDC_IDC_LEAD_LOCATION: NORMAL
MDC_IDC_LEAD_LOCATION_DETAIL_1: NORMAL
MDC_IDC_LEAD_MFG: NORMAL
MDC_IDC_LEAD_MODEL: NORMAL
MDC_IDC_LEAD_POLARITY_TYPE: NORMAL
MDC_IDC_LEAD_SERIAL: NORMAL
MDC_IDC_LEAD_SPECIAL_FUNCTION: NORMAL
MDC_IDC_MSMT_BATTERY_DTM: NORMAL
MDC_IDC_MSMT_BATTERY_REMAINING_PERCENTAGE: 15 %
MDC_IDC_MSMT_BATTERY_STATUS: NORMAL
MDC_IDC_PG_IMPLANT_DTM: NORMAL
MDC_IDC_PG_MFG: NORMAL
MDC_IDC_PG_MODEL: NORMAL
MDC_IDC_PG_SERIAL: NORMAL
MDC_IDC_PG_TYPE: NORMAL
MDC_IDC_SESS_CLINIC_NAME: NORMAL
MDC_IDC_SESS_DTM: NORMAL
MDC_IDC_SESS_TYPE: NORMAL
MDC_IDC_SET_ZONE_DETECTION_INTERVAL: 240 MS
MDC_IDC_SET_ZONE_DETECTION_INTERVAL: 273 MS
MDC_IDC_SET_ZONE_TYPE: NORMAL
MDC_IDC_SET_ZONE_TYPE: NORMAL
MDC_IDC_SET_ZONE_VENDOR_TYPE: NORMAL
MDC_IDC_SET_ZONE_VENDOR_TYPE: NORMAL
MDC_IDC_STAT_EPISODE_RECENT_COUNT: 0
MDC_IDC_STAT_EPISODE_RECENT_COUNT_DTM_END: NORMAL
MDC_IDC_STAT_EPISODE_RECENT_COUNT_DTM_START: NORMAL
MDC_IDC_STAT_EPISODE_TOTAL_COUNT: 0
MDC_IDC_STAT_EPISODE_TOTAL_COUNT: 0
MDC_IDC_STAT_EPISODE_TOTAL_COUNT_DTM_END: NORMAL
MDC_IDC_STAT_EPISODE_TOTAL_COUNT_DTM_END: NORMAL
MDC_IDC_STAT_EPISODE_TOTAL_COUNT_DTM_START: NORMAL
MDC_IDC_STAT_EPISODE_TOTAL_COUNT_DTM_START: NORMAL
MDC_IDC_STAT_EPISODE_TYPE: NORMAL
MDC_IDC_STAT_EPISODE_VENDOR_TYPE: NORMAL
MDC_IDC_STAT_TACHYTHERAPY_RECENT_DTM_END: NORMAL
MDC_IDC_STAT_TACHYTHERAPY_RECENT_DTM_START: NORMAL
MDC_IDC_STAT_TACHYTHERAPY_SHOCKS_DELIVERED_RECENT: 0
MDC_IDC_STAT_TACHYTHERAPY_SHOCKS_DELIVERED_TOTAL: 1
MDC_IDC_STAT_TACHYTHERAPY_TOTAL_DTM_END: NORMAL
MDC_IDC_STAT_TACHYTHERAPY_TOTAL_DTM_START: NORMAL

## 2023-07-02 ENCOUNTER — ANESTHESIA EVENT (OUTPATIENT)
Dept: SURGERY | Facility: CLINIC | Age: 53
End: 2023-07-02
Payer: COMMERCIAL

## 2023-07-03 ENCOUNTER — ANESTHESIA (OUTPATIENT)
Dept: SURGERY | Facility: CLINIC | Age: 53
End: 2023-07-03
Payer: COMMERCIAL

## 2023-07-03 ENCOUNTER — HOSPITAL ENCOUNTER (OUTPATIENT)
Facility: CLINIC | Age: 53
Discharge: HOME | End: 2023-07-03
Attending: INTERNAL MEDICINE | Admitting: INTERNAL MEDICINE
Payer: COMMERCIAL

## 2023-07-03 VITALS
OXYGEN SATURATION: 95 % | SYSTOLIC BLOOD PRESSURE: 117 MMHG | HEART RATE: 69 BPM | DIASTOLIC BLOOD PRESSURE: 70 MMHG | TEMPERATURE: 97.4 F | RESPIRATION RATE: 14 BRPM

## 2023-07-03 LAB — COLONOSCOPY: NORMAL

## 2023-07-03 PROCEDURE — 999N000141 HC STATISTIC PRE-PROCEDURE NURSING ASSESSMENT: Performed by: INTERNAL MEDICINE

## 2023-07-03 PROCEDURE — 710N000012 HC RECOVERY PHASE 2, PER MINUTE: Performed by: INTERNAL MEDICINE

## 2023-07-03 PROCEDURE — 272N000001 HC OR GENERAL SUPPLY STERILE: Performed by: INTERNAL MEDICINE

## 2023-07-03 PROCEDURE — 258N000003 HC RX IP 258 OP 636: Performed by: ANESTHESIOLOGY

## 2023-07-03 PROCEDURE — 250N000011 HC RX IP 250 OP 636: Performed by: NURSE ANESTHETIST, CERTIFIED REGISTERED

## 2023-07-03 PROCEDURE — 250N000009 HC RX 250: Performed by: NURSE ANESTHETIST, CERTIFIED REGISTERED

## 2023-07-03 PROCEDURE — 370N000017 HC ANESTHESIA TECHNICAL FEE, PER MIN: Performed by: INTERNAL MEDICINE

## 2023-07-03 PROCEDURE — 360N000075 HC SURGERY LEVEL 2, PER MIN: Performed by: INTERNAL MEDICINE

## 2023-07-03 RX ORDER — SODIUM CHLORIDE, SODIUM LACTATE, POTASSIUM CHLORIDE, CALCIUM CHLORIDE 600; 310; 30; 20 MG/100ML; MG/100ML; MG/100ML; MG/100ML
INJECTION, SOLUTION INTRAVENOUS CONTINUOUS
Status: DISCONTINUED | OUTPATIENT
Start: 2023-07-03 | End: 2023-07-03 | Stop reason: HOSPADM

## 2023-07-03 RX ORDER — FENTANYL CITRATE 50 UG/ML
25-100 INJECTION, SOLUTION INTRAMUSCULAR; INTRAVENOUS
Status: DISCONTINUED | OUTPATIENT
Start: 2023-07-03 | End: 2023-07-03 | Stop reason: HOSPADM

## 2023-07-03 RX ORDER — OXYCODONE HYDROCHLORIDE 5 MG/1
10 TABLET ORAL
Status: DISCONTINUED | OUTPATIENT
Start: 2023-07-03 | End: 2023-07-03 | Stop reason: HOSPADM

## 2023-07-03 RX ORDER — PROPOFOL 10 MG/ML
INJECTION, EMULSION INTRAVENOUS PRN
Status: DISCONTINUED | OUTPATIENT
Start: 2023-07-03 | End: 2023-07-03

## 2023-07-03 RX ORDER — OXYCODONE HYDROCHLORIDE 5 MG/1
5 TABLET ORAL
Status: DISCONTINUED | OUTPATIENT
Start: 2023-07-03 | End: 2023-07-03 | Stop reason: HOSPADM

## 2023-07-03 RX ORDER — FENTANYL CITRATE 50 UG/ML
50 INJECTION, SOLUTION INTRAMUSCULAR; INTRAVENOUS EVERY 5 MIN PRN
Status: DISCONTINUED | OUTPATIENT
Start: 2023-07-03 | End: 2023-07-03 | Stop reason: HOSPADM

## 2023-07-03 RX ORDER — ONDANSETRON 4 MG/1
4 TABLET, ORALLY DISINTEGRATING ORAL EVERY 30 MIN PRN
Status: DISCONTINUED | OUTPATIENT
Start: 2023-07-03 | End: 2023-07-03 | Stop reason: HOSPADM

## 2023-07-03 RX ORDER — LIDOCAINE HYDROCHLORIDE 10 MG/ML
INJECTION, SOLUTION INFILTRATION; PERINEURAL PRN
Status: DISCONTINUED | OUTPATIENT
Start: 2023-07-03 | End: 2023-07-03

## 2023-07-03 RX ORDER — HYDROMORPHONE HCL IN WATER/PF 6 MG/30 ML
0.4 PATIENT CONTROLLED ANALGESIA SYRINGE INTRAVENOUS EVERY 5 MIN PRN
Status: DISCONTINUED | OUTPATIENT
Start: 2023-07-03 | End: 2023-07-03 | Stop reason: HOSPADM

## 2023-07-03 RX ORDER — HYDROMORPHONE HCL IN WATER/PF 6 MG/30 ML
0.2 PATIENT CONTROLLED ANALGESIA SYRINGE INTRAVENOUS EVERY 5 MIN PRN
Status: DISCONTINUED | OUTPATIENT
Start: 2023-07-03 | End: 2023-07-03 | Stop reason: HOSPADM

## 2023-07-03 RX ORDER — FENTANYL CITRATE 50 UG/ML
25 INJECTION, SOLUTION INTRAMUSCULAR; INTRAVENOUS EVERY 5 MIN PRN
Status: DISCONTINUED | OUTPATIENT
Start: 2023-07-03 | End: 2023-07-03 | Stop reason: HOSPADM

## 2023-07-03 RX ORDER — LIDOCAINE 40 MG/G
CREAM TOPICAL
Status: DISCONTINUED | OUTPATIENT
Start: 2023-07-03 | End: 2023-07-03 | Stop reason: HOSPADM

## 2023-07-03 RX ORDER — PROPOFOL 10 MG/ML
INJECTION, EMULSION INTRAVENOUS CONTINUOUS PRN
Status: DISCONTINUED | OUTPATIENT
Start: 2023-07-03 | End: 2023-07-03

## 2023-07-03 RX ORDER — ONDANSETRON 2 MG/ML
4 INJECTION INTRAMUSCULAR; INTRAVENOUS EVERY 30 MIN PRN
Status: DISCONTINUED | OUTPATIENT
Start: 2023-07-03 | End: 2023-07-03 | Stop reason: HOSPADM

## 2023-07-03 RX ADMIN — PROPOFOL 150 MCG/KG/MIN: 10 INJECTION, EMULSION INTRAVENOUS at 07:37

## 2023-07-03 RX ADMIN — PROPOFOL 50 MG: 10 INJECTION, EMULSION INTRAVENOUS at 07:37

## 2023-07-03 RX ADMIN — SODIUM CHLORIDE, POTASSIUM CHLORIDE, SODIUM LACTATE AND CALCIUM CHLORIDE: 600; 310; 30; 20 INJECTION, SOLUTION INTRAVENOUS at 06:44

## 2023-07-03 RX ADMIN — LIDOCAINE HYDROCHLORIDE 2 ML: 10 INJECTION, SOLUTION INFILTRATION; PERINEURAL at 07:37

## 2023-07-03 ASSESSMENT — ACTIVITIES OF DAILY LIVING (ADL): ADLS_ACUITY_SCORE: 35

## 2023-07-03 NOTE — ANESTHESIA PREPROCEDURE EVALUATION
Anesthesia Pre-Procedure Evaluation    Patient: Lauren Lewis   MRN: [id number] : 1970        Procedure : Procedure(s):  COLONOSCOPY          Past Medical History:   Diagnosis Date     Afferent pupillary defect     RE     AICD (automatic cardioverter/defibrillator) present 2015     Cardiac arrest (H) 2015     Enlarged blind spot     RE     H/O Graves' disease 1987    Radioactive iodine treatment     H/O seasonal allergies      HOCM (hypertrophic obstructive cardiomyopathy) (H)     apical HOCM     Liver laceration 2015    from CPR; s/p hepatic embolization     Thyroid disease      Visual field loss 2013    RE / temporal      Past Surgical History:   Procedure Laterality Date     ABDOMEN SURGERY      c- section x 1      COLONOSCOPY N/A 2018    Procedure: COMBINED COLONOSCOPY, SINGLE OR MULTIPLE BIOPSY/POLYPECTOMY BY BIOPSY;  Surgeon: Marina Morris MD;  Location:  GI     DILATION AND CURETTAGE       IMPLANT AUTOMATIC IMPLANTABLE CARDIOVERTER DEFIBRILLATOR        No Known Allergies   Social History     Tobacco Use     Smoking status: Never     Smokeless tobacco: Never   Substance Use Topics     Alcohol use: Yes     Comment: rarely      Wt Readings from Last 1 Encounters:   10/23/22 81.6 kg (180 lb)        Anesthesia Evaluation   Pt has had prior anesthetic.     No history of anesthetic complications       ROS/MED HX  ENT/Pulmonary:  - neg pulmonary ROS     Neurologic:  - neg neurologic ROS     Cardiovascular: Comment: Echo :  Interpretation Summary  Global and regional left ventricular function is normal with an EF of 55-60%.  Left ventricular wall thickness is normal.  Global right ventricular function is normal.  Mild right ventricular dilation is present.    (+) -----ICD congenital heart disease (HOCM)     METS/Exercise Tolerance:     Hematologic:       Musculoskeletal:       GI/Hepatic:  - neg GI/hepatic ROS     Renal/Genitourinary:       Endo:      (+) thyroid problem,     Psychiatric/Substance Use:       Infectious Disease:       Malignancy:       Other:            Physical Exam    Airway        Mallampati: II   TM distance: > 3 FB   Neck ROM: full   Mouth opening: > 3 cm    Respiratory Devices and Support         Dental       (+) Minor Abnormalities - some fillings, tiny chips      Cardiovascular          Rhythm and rate: regular and normal     Pulmonary           breath sounds clear to auscultation           OUTSIDE LABS:  CBC:   Lab Results   Component Value Date    WBC 7.7 10/23/2022    WBC 4.7 01/28/2022    HGB 13.9 10/23/2022    HGB 14.6 01/28/2022    HCT 41.8 10/23/2022    HCT 45.3 01/28/2022     10/23/2022     01/28/2022     BMP:   Lab Results   Component Value Date     10/23/2022     01/28/2022    POTASSIUM 3.9 10/23/2022    POTASSIUM 4.1 01/28/2022    CHLORIDE 103 10/23/2022    CHLORIDE 106 01/28/2022    CO2 24 10/23/2022    CO2 29 01/28/2022    BUN 15.0 10/23/2022    BUN 14 01/28/2022    CR 1.02 (H) 10/23/2022    CR 0.95 01/28/2022     (H) 10/23/2022     (H) 01/28/2022     COAGS: No results found for: PTT, INR, FIBR  POC:   Lab Results   Component Value Date    HCGS Negative 10/27/2015     HEPATIC:   Lab Results   Component Value Date    ALBUMIN 4.0 10/23/2022    PROTTOTAL 6.3 (L) 10/23/2022    ALT 18 10/23/2022    AST 22 10/23/2022    ALKPHOS 96 10/23/2022    BILITOTAL 0.2 10/23/2022     OTHER:   Lab Results   Component Value Date    ZAKIA 9.1 10/23/2022    TSH 4.20 (H) 12/12/2017    T4 1.24 12/12/2017       Anesthesia Plan    ASA Status:  3   NPO Status:  NPO Appropriate    Anesthesia Type: MAC.              Consents    Anesthesia Plan(s) and associated risks, benefits, and realistic alternatives discussed. Questions answered and patient/representative(s) expressed understanding.     - Discussed: Risks, Benefits and Alternatives for the PROCEDURE were discussed     - Discussed with:  Patient, Spouse          Postoperative Care            Comments:    Other Comments: If cautery to be used, put magnet over ICD            Gavin Justice MD

## 2023-07-03 NOTE — ANESTHESIA CARE TRANSFER NOTE
Patient: Lauren Lewis    Procedure: Procedure(s):  COLONOSCOPY       Diagnosis: Rectal bleeding [K62.5]  Screening for colon cancer [Z12.11]  Diagnosis Additional Information: No value filed.    Anesthesia Type:   MAC     Note:    Oropharynx: oropharynx clear of all foreign objects  Level of Consciousness: drowsy  Oxygen Supplementation: room air    Independent Airway: airway patency satisfactory and stable  Dentition: dentition unchanged  Vital Signs Stable: post-procedure vital signs reviewed and stable  Report to RN Given: handoff report given  Patient transferred to: Phase II    Handoff Report: Identifed the Patient, Identified the Reponsible Provider, Reviewed the pertinent medical history, Discussed the surgical course, Reviewed Intra-OP anesthesia mangement and issues during anesthesia, Set expectations for post-procedure period and Allowed opportunity for questions and acknowledgement of understanding      Vitals:  Vitals Value Taken Time   BP     Temp     Pulse     Resp     SpO2         Electronically Signed By: KEITH BARRIENTOS CRNA  July 3, 2023  7:59 AM

## 2023-07-03 NOTE — ANESTHESIA POSTPROCEDURE EVALUATION
Patient: Lauren Lewis    Procedure: Procedure(s):  COLONOSCOPY       Anesthesia Type:  MAC    Note:     Postop Pain Control: Uneventful            Sign Out: Well controlled pain   PONV: No   Neuro/Psych: Uneventful            Sign Out: Acceptable/Baseline neuro status   Airway/Respiratory: Uneventful            Sign Out: Acceptable/Baseline resp. status   CV/Hemodynamics: Uneventful            Sign Out: Acceptable CV status; No obvious hypovolemia; No obvious fluid overload   Other NRE: NONE   DID A NON-ROUTINE EVENT OCCUR? No           Last vitals:  Vitals Value Taken Time   /70 07/03/23 0823   Temp 36.3  C (97.4  F) 07/03/23 0823   Pulse 71 07/03/23 0811   Resp 14 07/03/23 0823   SpO2 95 % 07/03/23 0823   Vitals shown include unvalidated device data.    Electronically Signed By: Gavin Justice MD

## 2023-07-03 NOTE — H&P
Preop H&P:    This is a woman with bright red blood per rectum.  She is here for colonoscopy.  There is no cardiac or pulmonary issues.  She is otherwise healthy.    ASA 2    Chest clear to auscultation    Cardiovascular exam shows a regular rate and rhythm    The airway is intact.      Colonoscopy is planned.  There is nothing on history or physical to preclude this exam.

## 2023-08-29 ENCOUNTER — ANCILLARY PROCEDURE (OUTPATIENT)
Dept: CARDIOLOGY | Facility: CLINIC | Age: 53
End: 2023-08-29
Attending: INTERNAL MEDICINE
Payer: COMMERCIAL

## 2023-08-29 DIAGNOSIS — Z95.810 AICD (AUTOMATIC CARDIOVERTER/DEFIBRILLATOR) PRESENT: ICD-10-CM

## 2023-08-29 DIAGNOSIS — I46.9 CARDIAC ARREST (H): ICD-10-CM

## 2023-08-29 PROCEDURE — 93295 DEV INTERROG REMOTE 1/2/MLT: CPT | Performed by: INTERNAL MEDICINE

## 2023-08-29 PROCEDURE — 93296 REM INTERROG EVL PM/IDS: CPT

## 2023-09-18 LAB
MDC_IDC_LEAD_IMPLANT_DT: NORMAL
MDC_IDC_LEAD_LOCATION: NORMAL
MDC_IDC_LEAD_LOCATION_DETAIL_1: NORMAL
MDC_IDC_LEAD_MFG: NORMAL
MDC_IDC_LEAD_MODEL: NORMAL
MDC_IDC_LEAD_POLARITY_TYPE: NORMAL
MDC_IDC_LEAD_SERIAL: NORMAL
MDC_IDC_LEAD_SPECIAL_FUNCTION: NORMAL
MDC_IDC_MSMT_BATTERY_DTM: NORMAL
MDC_IDC_MSMT_BATTERY_REMAINING_PERCENTAGE: 12 %
MDC_IDC_MSMT_BATTERY_STATUS: NORMAL
MDC_IDC_MSMT_LEADCHNL_RV_IMPEDANCE_VALUE: 120 OHM
MDC_IDC_PG_IMPLANT_DTM: NORMAL
MDC_IDC_PG_MFG: NORMAL
MDC_IDC_PG_MODEL: NORMAL
MDC_IDC_PG_SERIAL: NORMAL
MDC_IDC_PG_TYPE: NORMAL
MDC_IDC_SESS_CLINIC_NAME: NORMAL
MDC_IDC_SESS_DTM: NORMAL
MDC_IDC_SESS_TYPE: NORMAL
MDC_IDC_SET_ZONE_DETECTION_INTERVAL: 240 MS
MDC_IDC_SET_ZONE_DETECTION_INTERVAL: 273 MS
MDC_IDC_SET_ZONE_TYPE: NORMAL
MDC_IDC_SET_ZONE_TYPE: NORMAL
MDC_IDC_SET_ZONE_VENDOR_TYPE: NORMAL
MDC_IDC_SET_ZONE_VENDOR_TYPE: NORMAL
MDC_IDC_STAT_EPISODE_RECENT_COUNT: 0
MDC_IDC_STAT_EPISODE_RECENT_COUNT_DTM_END: NORMAL
MDC_IDC_STAT_EPISODE_RECENT_COUNT_DTM_START: NORMAL
MDC_IDC_STAT_EPISODE_TOTAL_COUNT: 0
MDC_IDC_STAT_EPISODE_TOTAL_COUNT: 0
MDC_IDC_STAT_EPISODE_TOTAL_COUNT_DTM_END: NORMAL
MDC_IDC_STAT_EPISODE_TOTAL_COUNT_DTM_END: NORMAL
MDC_IDC_STAT_EPISODE_TOTAL_COUNT_DTM_START: NORMAL
MDC_IDC_STAT_EPISODE_TOTAL_COUNT_DTM_START: NORMAL
MDC_IDC_STAT_EPISODE_TYPE: NORMAL
MDC_IDC_STAT_EPISODE_VENDOR_TYPE: NORMAL
MDC_IDC_STAT_TACHYTHERAPY_RECENT_DTM_END: NORMAL
MDC_IDC_STAT_TACHYTHERAPY_RECENT_DTM_START: NORMAL
MDC_IDC_STAT_TACHYTHERAPY_SHOCKS_DELIVERED_RECENT: 0
MDC_IDC_STAT_TACHYTHERAPY_SHOCKS_DELIVERED_TOTAL: 1
MDC_IDC_STAT_TACHYTHERAPY_TOTAL_DTM_END: NORMAL
MDC_IDC_STAT_TACHYTHERAPY_TOTAL_DTM_START: NORMAL

## 2023-10-11 DIAGNOSIS — I42.1 HOCM (HYPERTROPHIC OBSTRUCTIVE CARDIOMYOPATHY) (H): Primary | ICD-10-CM

## 2023-10-11 DIAGNOSIS — I42.2 HYPERTROPHIC CARDIOMYOPATHY (H): ICD-10-CM

## 2023-10-11 NOTE — PROGRESS NOTES
Date: 10/11/2023    Time of Call: 8:50 AM     Diagnosis:  HCM     [ TORB ] Ordering provider: Dr. Tyshawn Pablo  Order: Establish care with Dr. Pablo with ECHO, fasting labs, EKG and device check prior. Former [facility] patient.      Order received by: Amna JUAREZ RN      Follow-up/additional notes: From last visit with Dr. Paz in January 2022: follow up in 2 years with ECHO, labs, EKG and device check prior.

## 2023-10-21 NOTE — PATIENT INSTRUCTIONS
You were seen today in the Adult Congenital and Cardiovascular Genetics Clinic at the [facility].    Cardiology Providers you saw during your visit:  Belle Paz MD    Diagnosis:  HCM    Results:  Belle Paz MD reviewed the results of your lab, echo, EKG and device testing today in clinic.    Recommendations:    1. Continue to eat a heart healthy, low salt diet.  2. Continue to get 20-30 minutes of aerobic activity, 4-5 days per week.  Examples of aerobic activity include walking, running, swimming, cycling, etc.  3. Continue to observe good oral hygiene, with regular dental visits.  4. No changes today.    Exercise restrictions:   Yes__X__  No____         If yes, list restrictions:  Must be allowed to rest if fatigued or SOB    Work restrictions:  Yes____  No_X___         If yes, list restrictions:    FASTING CHOLESTEROL was checked in the last 5 years YES_X__  NO___ (2018)  Continue to eat a heart healthy, low salt diet.         ____ Fasting lipid panel order today         ____ No changes in medications          ____ I recommend the following changes in your cholesterol medications.:          ____ Please follow up for cholesterol screening at your primary care physician      Follow-up: Follow up with Dr. Paz in 2 years with an ECHO, labs, EKG and device check prior.    If you have questions or concerns please contact us at:    Alma Jacome MSN, RN, CNL  Arlin Smith (Scheduling)  Nurse Care Coordinator     Clinic   Adult Congenital and CV Genetics   Adult Congenital and CV Genetic  [facility]  (P) [phone]     (P) [phone]         (F) [phone]        For after hours urgent needs, call [phone] and ask to speak to the Adult Congenital Physician on call.  Mention Job Code 0401.    For emergencies call [phone].    [facility]  Mail Code [id number]  [address] [place]  [address]     Admission Reconciliation is Completed  Discharge Reconciliation is Not Complete Admission Reconciliation is Completed  Discharge Reconciliation is Completed

## 2023-12-05 ENCOUNTER — ANCILLARY PROCEDURE (OUTPATIENT)
Dept: CARDIOLOGY | Facility: CLINIC | Age: 53
End: 2023-12-05
Attending: INTERNAL MEDICINE
Payer: COMMERCIAL

## 2023-12-05 DIAGNOSIS — I46.9 CARDIAC ARREST (H): ICD-10-CM

## 2023-12-05 DIAGNOSIS — Z95.810 AICD (AUTOMATIC CARDIOVERTER/DEFIBRILLATOR) PRESENT: ICD-10-CM

## 2023-12-05 PROCEDURE — 93296 REM INTERROG EVL PM/IDS: CPT

## 2023-12-05 PROCEDURE — 93295 DEV INTERROG REMOTE 1/2/MLT: CPT | Performed by: INTERNAL MEDICINE

## 2024-01-20 ENCOUNTER — HEALTH MAINTENANCE LETTER (OUTPATIENT)
Age: 54
End: 2024-01-20

## 2024-02-06 LAB
MDC_IDC_LEAD_CONNECTION_STATUS: NORMAL
MDC_IDC_LEAD_IMPLANT_DT: NORMAL
MDC_IDC_LEAD_LOCATION: NORMAL
MDC_IDC_LEAD_LOCATION_DETAIL_1: NORMAL
MDC_IDC_LEAD_MFG: NORMAL
MDC_IDC_LEAD_MODEL: NORMAL
MDC_IDC_LEAD_POLARITY_TYPE: NORMAL
MDC_IDC_LEAD_SERIAL: NORMAL
MDC_IDC_LEAD_SPECIAL_FUNCTION: NORMAL
MDC_IDC_MSMT_BATTERY_DTM: NORMAL
MDC_IDC_MSMT_BATTERY_REMAINING_PERCENTAGE: 11 %
MDC_IDC_MSMT_BATTERY_STATUS: NORMAL
MDC_IDC_MSMT_LEADCHNL_RV_IMPEDANCE_VALUE: 115 OHM
MDC_IDC_PG_IMPLANT_DTM: NORMAL
MDC_IDC_PG_MFG: NORMAL
MDC_IDC_PG_MODEL: NORMAL
MDC_IDC_PG_SERIAL: NORMAL
MDC_IDC_PG_TYPE: NORMAL
MDC_IDC_SESS_CLINIC_NAME: NORMAL
MDC_IDC_SESS_DTM: NORMAL
MDC_IDC_SESS_TYPE: NORMAL
MDC_IDC_SET_ZONE_DETECTION_INTERVAL: 240 MS
MDC_IDC_SET_ZONE_DETECTION_INTERVAL: 273 MS
MDC_IDC_SET_ZONE_STATUS: NORMAL
MDC_IDC_SET_ZONE_STATUS: NORMAL
MDC_IDC_SET_ZONE_TYPE: NORMAL
MDC_IDC_SET_ZONE_TYPE: NORMAL
MDC_IDC_SET_ZONE_VENDOR_TYPE: NORMAL
MDC_IDC_SET_ZONE_VENDOR_TYPE: NORMAL
MDC_IDC_STAT_AT_BURDEN_PERCENT: 0 %
MDC_IDC_STAT_EPISODE_RECENT_COUNT: 0
MDC_IDC_STAT_EPISODE_RECENT_COUNT_DTM_END: NORMAL
MDC_IDC_STAT_EPISODE_RECENT_COUNT_DTM_START: NORMAL
MDC_IDC_STAT_EPISODE_TOTAL_COUNT: 0
MDC_IDC_STAT_EPISODE_TOTAL_COUNT_DTM_END: NORMAL
MDC_IDC_STAT_EPISODE_TOTAL_COUNT_DTM_START: NORMAL
MDC_IDC_STAT_EPISODE_TYPE: NORMAL
MDC_IDC_STAT_EPISODE_VENDOR_TYPE: NORMAL
MDC_IDC_STAT_TACHYTHERAPY_RECENT_DTM_END: NORMAL
MDC_IDC_STAT_TACHYTHERAPY_RECENT_DTM_START: NORMAL
MDC_IDC_STAT_TACHYTHERAPY_SHOCKS_DELIVERED_RECENT: 0
MDC_IDC_STAT_TACHYTHERAPY_SHOCKS_DELIVERED_TOTAL: 1
MDC_IDC_STAT_TACHYTHERAPY_TOTAL_DTM_END: NORMAL
MDC_IDC_STAT_TACHYTHERAPY_TOTAL_DTM_START: NORMAL

## 2024-04-08 NOTE — PROGRESS NOTES
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     She was previously followed by Dr. Belle Paz, whom she last saw 1/28/22. She was doing well at that time and plan was for follow up in 2 years.     She walks regularly and has not noticed new symptoms. She is tolerating medications well. She denies any chest pain, dyspnea at rest or with exertion, palpitations, PND, orthopnea, peripheral edema, lightheadedness, or syncope.       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  Fortunately, her children were found to NOT have this gene mutation.    SOCIAL HISTORY:  Social History     Social History    Marital Status:      Spouse Name: N/A    Number of Children: N/A    Years of Education: N/A     Social History Main Topics    Smoking status: Never Smoker     Smokeless tobacco: None    Alcohol Use: No    Drug Use: No    Sexual Activity:     Partners: Male     Other Topics Concern    None     Social History Narrative        CURRENT MEDICATIONS:  Current Outpatient Medications   Medication Sig Dispense Refill    escitalopram (LEXAPRO) 10 MG tablet Take 10 mg by mouth      levothyroxine (SYNTHROID) 100 MCG tablet Take 1 tablet by mouth daily      metoprolol succinate ER (TOPROL-XL) 100 MG 24 hr tablet Take 1 tablet (100 mg) by mouth daily 90 tablet 0    OMEPRAZOLE Take 40 mg by mouth daily      simvastatin (ZOCOR) 20 MG tablet Take 20 mg by mouth At Bedtime       spironolactone (ALDACTONE) 25 MG tablet Take 1 tablet (25 mg) by mouth daily 90 tablet 0    VITAMIN D, CHOLECALCIFEROL, PO Take 1 capsule by mouth daily            EXAM:  /85 (BP Location: Left arm, Patient Position: Sitting, Cuff Size: Adult Regular)   Pulse 67   Wt 87.1 kg (192 lb)   LMP 09/28/2015 (Exact Date)   SpO2 97%   BMI 31.95 kg/m    General: appears comfortable, alert and articulate  Head: normocephalic, atraumatic  Eyes: anicteric sclera, EOMI  Neck: no adenopathy, 2+ carotids  Heart: regular, S1/S2, no murmur, gallop, rub, estimated JVP~7 cm H2O  Lungs: clear, no rales or wheezing  Abdomen: soft, non-tender, bowel sounds present, no hepatomegaly  Extremities: warm, well perfused.  no clubbing, cyanosis or edema  Neurological: normal speech and affect, no gross motor deficits    Genetic testing 10/21/15 ([employer]): Lauren CARRIES her family's MYBPC3 mutation (c.927-2 A>G)  NM_000256.3(MYBPC3):c.927-2A>G (https://www.ncbi.nlm.nih.gov/clinvar/AQL357480077/)          I personally visualized and interpreted:  Echo 04/09/24: Known apical variant HCM. Normal LV/RV function, LVEF=55-60%. No significant valvular abnormalities. No detectable resting LVOT obstruction or ALLIE. No change from 1/28/22.     S-ICD interrogation 04/09/24: No arrhythmias. 7% battery to RRT.    ECG 04/09/24: sinus, low voltage, nonspecific ST/T abnomality. No significant change from 1/28/22.     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     Non-obstructive HCM, apical variant due to MYBPC3 mutation  VF arrest s/p secondary prevention S-ICD  Lauren continues to feel well and is stably euvolemic and well-perfused without changes in exertional capacity. S-ICD interrogation confirms no recent significant arrhythmias.  Echo today with stable findings.  BP today well controlled.      -seen by Dr. Michel today to coordinate S-ICD generator change  -Continue metoprolol xl 100 mg daily and spironolactone 25mg daily   -she is aware of exercise recommendations in HCM  -both of her sons have been tested and are negative for the familial MYBPC3 mutation  -she is also aware of recommendation for all first degree relatives to undergo clinical screening unless they opt to pursue genetic testing and are found to be gene negative as with her children,       Follow up:  Follow-up in 2 years with TTE, S-ICD interrogation, ECG, and labs prior.Will be happy to see sooner if change in clinical status or new questions/concerns arise.    I spent a total of 41 minutes on the day of the visit.   Time spent by me doing chart review, history and exam, documentation and further activities per the note      Tyshawn Pablo MD  Cardiology

## 2024-04-09 ENCOUNTER — ANCILLARY PROCEDURE (OUTPATIENT)
Dept: CARDIOLOGY | Facility: CLINIC | Age: 54
End: 2024-04-09
Attending: INTERNAL MEDICINE
Payer: COMMERCIAL

## 2024-04-09 ENCOUNTER — LAB (OUTPATIENT)
Dept: LAB | Facility: CLINIC | Age: 54
End: 2024-04-09
Attending: INTERNAL MEDICINE
Payer: COMMERCIAL

## 2024-04-09 ENCOUNTER — TELEPHONE (OUTPATIENT)
Dept: CARDIOLOGY | Facility: CLINIC | Age: 54
End: 2024-04-09

## 2024-04-09 VITALS
OXYGEN SATURATION: 97 % | BODY MASS INDEX: 32 KG/M2 | DIASTOLIC BLOOD PRESSURE: 85 MMHG | SYSTOLIC BLOOD PRESSURE: 137 MMHG | HEART RATE: 67 BPM | WEIGHT: 192.3 LBS

## 2024-04-09 VITALS
WEIGHT: 192 LBS | SYSTOLIC BLOOD PRESSURE: 137 MMHG | DIASTOLIC BLOOD PRESSURE: 85 MMHG | OXYGEN SATURATION: 97 % | BODY MASS INDEX: 31.95 KG/M2 | HEART RATE: 67 BPM

## 2024-04-09 DIAGNOSIS — I42.1 HOCM (HYPERTROPHIC OBSTRUCTIVE CARDIOMYOPATHY) (H): Primary | ICD-10-CM

## 2024-04-09 DIAGNOSIS — Z95.810 AICD (AUTOMATIC CARDIOVERTER/DEFIBRILLATOR) PRESENT: ICD-10-CM

## 2024-04-09 DIAGNOSIS — I42.2 HYPERTROPHIC CARDIOMYOPATHY (H): ICD-10-CM

## 2024-04-09 DIAGNOSIS — I42.2 NON-OBSTRUCTIVE HYPERTROPHIC CARDIOMYOPATHY (H): ICD-10-CM

## 2024-04-09 DIAGNOSIS — I42.1 HOCM (HYPERTROPHIC OBSTRUCTIVE CARDIOMYOPATHY) (H): ICD-10-CM

## 2024-04-09 DIAGNOSIS — I46.9 CARDIAC ARREST (H): ICD-10-CM

## 2024-04-09 LAB
ALBUMIN SERPL BCG-MCNC: 4.3 G/DL (ref 3.5–5.2)
ALP SERPL-CCNC: 90 U/L (ref 40–150)
ALT SERPL W P-5'-P-CCNC: 27 U/L (ref 0–50)
ANION GAP SERPL CALCULATED.3IONS-SCNC: 12 MMOL/L (ref 7–15)
AST SERPL W P-5'-P-CCNC: 25 U/L (ref 0–45)
BASOPHILS # BLD AUTO: 0 10E3/UL (ref 0–0.2)
BASOPHILS NFR BLD AUTO: 1 %
BILIRUB SERPL-MCNC: 0.5 MG/DL
BUN SERPL-MCNC: 13.9 MG/DL (ref 6–20)
CALCIUM SERPL-MCNC: 9.7 MG/DL (ref 8.6–10)
CHLORIDE SERPL-SCNC: 105 MMOL/L (ref 98–107)
CHOLEST SERPL-MCNC: 165 MG/DL
CREAT SERPL-MCNC: 1.16 MG/DL (ref 0.51–0.95)
DEPRECATED HCO3 PLAS-SCNC: 25 MMOL/L (ref 22–29)
EGFRCR SERPLBLD CKD-EPI 2021: 55 ML/MIN/1.73M2
EOSINOPHIL # BLD AUTO: 0.1 10E3/UL (ref 0–0.7)
EOSINOPHIL NFR BLD AUTO: 1 %
ERYTHROCYTE [DISTWIDTH] IN BLOOD BY AUTOMATED COUNT: 12.4 % (ref 10–15)
FASTING STATUS PATIENT QL REPORTED: YES
GLUCOSE SERPL-MCNC: 94 MG/DL (ref 70–99)
HCT VFR BLD AUTO: 45.5 % (ref 35–47)
HDLC SERPL-MCNC: 49 MG/DL
HGB BLD-MCNC: 15.6 G/DL (ref 11.7–15.7)
IMM GRANULOCYTES # BLD: 0 10E3/UL
IMM GRANULOCYTES NFR BLD: 0 %
LDLC SERPL CALC-MCNC: 96 MG/DL
LVEF ECHO: NORMAL
LYMPHOCYTES # BLD AUTO: 2.1 10E3/UL (ref 0.8–5.3)
LYMPHOCYTES NFR BLD AUTO: 36 %
MCH RBC QN AUTO: 30.5 PG (ref 26.5–33)
MCHC RBC AUTO-ENTMCNC: 34.3 G/DL (ref 31.5–36.5)
MCV RBC AUTO: 89 FL (ref 78–100)
MONOCYTES # BLD AUTO: 0.6 10E3/UL (ref 0–1.3)
MONOCYTES NFR BLD AUTO: 10 %
NEUTROPHILS # BLD AUTO: 3 10E3/UL (ref 1.6–8.3)
NEUTROPHILS NFR BLD AUTO: 52 %
NONHDLC SERPL-MCNC: 116 MG/DL
NRBC # BLD AUTO: 0 10E3/UL
NRBC BLD AUTO-RTO: 0 /100
PLATELET # BLD AUTO: 229 10E3/UL (ref 150–450)
POTASSIUM SERPL-SCNC: 4.6 MMOL/L (ref 3.4–5.3)
PROT SERPL-MCNC: 7.2 G/DL (ref 6.4–8.3)
RBC # BLD AUTO: 5.11 10E6/UL (ref 3.8–5.2)
SODIUM SERPL-SCNC: 142 MMOL/L (ref 135–145)
TRIGL SERPL-MCNC: 101 MG/DL
WBC # BLD AUTO: 5.9 10E3/UL (ref 4–11)

## 2024-04-09 PROCEDURE — 36415 COLL VENOUS BLD VENIPUNCTURE: CPT | Performed by: PATHOLOGY

## 2024-04-09 PROCEDURE — 85025 COMPLETE CBC W/AUTO DIFF WBC: CPT | Performed by: PATHOLOGY

## 2024-04-09 PROCEDURE — 80061 LIPID PANEL: CPT | Performed by: PATHOLOGY

## 2024-04-09 PROCEDURE — 80053 COMPREHEN METABOLIC PANEL: CPT | Performed by: PATHOLOGY

## 2024-04-09 PROCEDURE — 99215 OFFICE O/P EST HI 40 MIN: CPT | Mod: 25 | Performed by: INTERNAL MEDICINE

## 2024-04-09 PROCEDURE — 99204 OFFICE O/P NEW MOD 45 MIN: CPT | Mod: 25 | Performed by: INTERNAL MEDICINE

## 2024-04-09 PROCEDURE — 93261 INTERROGATE SUBQ DEFIB: CPT | Performed by: INTERNAL MEDICINE

## 2024-04-09 PROCEDURE — 93306 TTE W/DOPPLER COMPLETE: CPT | Performed by: INTERNAL MEDICINE

## 2024-04-09 PROCEDURE — 99213 OFFICE O/P EST LOW 20 MIN: CPT | Mod: 27 | Performed by: INTERNAL MEDICINE

## 2024-04-09 PROCEDURE — 93010 ELECTROCARDIOGRAM REPORT: CPT | Performed by: INTERNAL MEDICINE

## 2024-04-09 PROCEDURE — 93005 ELECTROCARDIOGRAM TRACING: CPT

## 2024-04-09 PROCEDURE — 99211 OFF/OP EST MAY X REQ PHY/QHP: CPT | Performed by: INTERNAL MEDICINE

## 2024-04-09 ASSESSMENT — PAIN SCALES - GENERAL
PAINLEVEL: NO PAIN (0)
PAINLEVEL: NO PAIN (0)

## 2024-04-09 NOTE — PATIENT INSTRUCTIONS
It was a pleasure to see you in clinic today. Please do not hesitate to call with any questions or concerns.     Maryjo Puente, RN  Electrophysiology Nurse Clinician  [facility]  During business hours call:  [phone]  Urgent needs after hours- please call: [phone]- select option #4 and ask for job code [phone].

## 2024-04-09 NOTE — PROGRESS NOTES
ELECTROPHYSIOLOGY CLINIC VISIT    Assessment/Recommendations   Assessment/Plan:    Ms. Lewis is a 53 year old female who has a medical history significant for HCM  With MYBPC3 mutation, VF arrest s/p secondary prevention S-ICD 9/2015.    Hypertrophic cardiomyopathy  -Continue beta blockers  -Encouraged adequate hydration and avoidance of strenous physical activity   -Reinforced exercise recommendations with HCM (e.g. Circ 2014 recommendations: Avoidance of burst exercise, competitive training,weight-lifting)  Family Risk   -She has underwent genetic testing and carries MYBPC3 gene mutation.   - Discussed in detail with patient family screening   Risk stratification for sudden cardiac death   She had out of hospital resuscitated VF arrest and had S-ICD implanted as secondary prevention. Normal device function. Approaching RRT. Discussed generator change in detailed including indications, risks, and benefits. She is agreeable to pursue generator change when RRT is reached  Follow up 3 months post generator change.        History of Present Illness/Subjective    Ms. Lauren Lewis is a 53 year old female who comes in today for EP consultation of HCM, S-ICD cares.    Ms. Lewis is a 53 year old female who has a medical history significant for HCM  With MYBPC3 mutation, VF arrest s/p secondary prevention S-ICD 9/2015.    She has a family history of HCM. She originally saw Dr. Bartholomew more than 35 years ago who felt that she likely had apical hypertrophy. Her echo was normal at that time. She had remained asymptomatic. However, in 9/2015 she suffered an out of hospital VF arrest with ROSC after an external defibrillation by EMS. She suffered a liver laceration as a result of the CPR requiring transfusion of 4 units of PRBCs and hepatic artery embolization.  Echo once again was within normal limits but MRI confirmed the presence of extensive fibrosis at the apex of the left ventricle consistent with  hypertrophic cardiomopathy in that area. Coronary angiogram was normal. She had S-ICD implanted as secondary prevention of SCD.     She presents today to establish EP Care. She reports feeling well. She denies chest discomfort, palpitations, abdominal fullness/bloating or peripheral edema, shortness of breath, paroxysmal nocturnal dyspnea, orthopnea, lightheadedness, dizziness, pre-syncope, or syncope.  An echo shows LVEF 55-60%, normal LV size, normal RV function, and no significant valvular abnormalities. Presenting 12 lead ECG shows SR Vent Rate 64 bpm,  ms, QRS 76 ms, QTc 429 ms. Device interrogation shows normal device function, stable lead parameters, no arrhythmias recorded,and RRT 7%. Current cardiac medications include: Spironolactone, Toprol XL, ad Simvastatin.       I have reviewed and updated the patient's Past Medical History, Social History, Family History and Medication List.     Cardiographics (Personally Reviewed) :   CMR 2015:  1. Overall preserved left ventricular systolic function with an EF of 60% and an apical area of severe hypokinesis to aneurysm with near transmural thickness gadolinium uptake. The mid ventricular portion of the ventricle is hyperdynamic. This is consistent with either a hypertrophic cardiomyopathy in a previously apical predominant fashion with subsequent myocardial fibrosis either secondary to infarction or HCM-related process. The distinction between an apical hypertrophic cardiomyopathy with or without superimposed apical myocardial infarction will be difficult to make without coronary angiography.   2. Borderline to low normal right ventricular systolic function in the setting of prolonged CPR, consider RV contusion. This is difficult to prove without a repeat study in a few weeks assessing for any change in right ventricular size or contraction.   3. Mitral regurgitation, mild.     Echo 2017  Normal left ventricular size, thickness, and function. EF 55-60%.  Normal left  ventricular filling for age. No regional wall motion abnormalities are seen.  Normal right ventricular size and function.  No significant valvular pathology.  The inferior vena cava was normal in size with preserved respiratory variability. No pericardial effusion.   No change from prior study 12/13/16.     Echo 12/6/19  Left ventricular function, chamber size, wall motion, and wall thickness are normal.The EF is 60-65%.  Right ventricular function, chamber size, wall motion, and thickness are normal.  This study was compared with the study from 12/12/17: There has been no change.     Echo 1/28/22  Global and regional left ventricular function is normal with an EF of 55-60%.  Left ventricular wall thickness is normal.  Global right ventricular function is normal.  Mild right ventricular dilation is present.  This study was compared with the study from 12/6/2019 .There has been no Change.    Echo 4/9/24:  terpretation Summary  Genotype + HCM, SQ ICD  Left ventricular size, wall motion and function are normal. The ejection  fraction is 55-60%. Wall thickness is <1 cm. There is no LV outflow  obstruction.  Global right ventricular function is normal.  No significant valvular abnormalities.  Mean RA pressure normal at 3 mmHg.  No pericardial effusion is present.     Physical Examination   /85 (BP Location: Left arm, Patient Position: Sitting, Cuff Size: Adult Regular)   Pulse 67   Wt 87.2 kg (192 lb 4.8 oz)   LMP 09/28/2015 (Exact Date)   SpO2 97%   BMI 32.00 kg/m    Wt Readings from Last 3 Encounters:   10/23/22 81.6 kg (180 lb)   01/28/22 82.5 kg (181 lb 14.4 oz)   12/06/19 83.8 kg (184 lb 11.2 oz)     General Appearance:   Alert, well-appearing and in no acute distress.   HEENT: Atraumatic, normocephalic. PERRL.  MMM.   Chest/Lungs:   Respirations unlabored.  Lungs are clear to auscultation.   Cardiovascular:   Regular rate and rhythm.  S1/S2. No murmur.    Abdomen:  Soft, nontender,  nondistended.   Extremities: No cyanosis or clubbing. No edema.    Musculoskeletal: Moves all extremities.  Gait normal.   Skin: Warm, dry, intact.    Neurologic: Mood and affect are appropriate.  Alert and oriented to person, place, time, and situation.          Medications  Allergies   Current Outpatient Medications   Medication Sig Dispense Refill    escitalopram (LEXAPRO) 10 MG tablet Take 10 mg by mouth      levothyroxine (SYNTHROID) 100 MCG tablet Take 1 tablet by mouth daily      metoprolol succinate ER (TOPROL-XL) 100 MG 24 hr tablet Take 1 tablet (100 mg) by mouth daily 90 tablet 0    OMEPRAZOLE Take 40 mg by mouth daily      simvastatin (ZOCOR) 20 MG tablet Take 20 mg by mouth At Bedtime       spironolactone (ALDACTONE) 25 MG tablet Take 1 tablet (25 mg) by mouth daily 90 tablet 0    VITAMIN D, CHOLECALCIFEROL, PO Take 1 capsule by mouth daily      No Known Allergies      Lab Results (Personally Reviewed)    Chemistry/lipid CBC Cardiac Enzymes/BNP/TSH/INR   Lab Results   Component Value Date    BUN 13.9 04/09/2024     04/09/2024    CO2 25 04/09/2024     Creatinine   Date Value Ref Range Status   04/09/2024 1.16 (H) 0.51 - 0.95 mg/dL Final   01/22/2020 1.07 mg/dL Final       Lab Results   Component Value Date    CHOL 165 04/09/2024    HDL 49 (L) 04/09/2024    LDL 96 04/09/2024      Lab Results   Component Value Date    WBC 5.9 04/09/2024    HGB 15.6 04/09/2024    HCT 45.5 04/09/2024    MCV 89 04/09/2024     04/09/2024    Lab Results   Component Value Date    TSH 4.20 (H) 12/12/2017        The patient states understanding and is agreeable with the plan.   Gael Michel MD [facility]  Cardiology - Electrophysiology      Total time spent on patient visit, reviewing notes, imaging, labs, orders, and completing necessary documentation: 45 minutes.

## 2024-04-09 NOTE — NURSING NOTE
Chief Complaint   Patient presents with    Follow Up     Return genetic heart     Vitals were taken, medications reconciled, and EKG was performed.    Adrianna Knight CNA  11:34 AM

## 2024-04-09 NOTE — LETTER
4/9/2024      RE: Lauren Lewis  [address] [place] [facility] [address]       Dear Colleague,    Thank you for the opportunity to participate in the care of your patient, Lauren Lewis, at the [facility]. Please see a copy of my visit note below.        ELECTROPHYSIOLOGY CLINIC VISIT    Assessment/Recommendations   Assessment/Plan:    Ms. Lewis is a 53 year old female who has a medical history significant for HCM  With MYBPC3 mutation, VF arrest s/p secondary prevention S-ICD 9/2015.    Hypertrophic cardiomyopathy  -Continue beta blockers  -Encouraged adequate hydration and avoidance of strenous physical activity   -Reinforced exercise recommendations with HCM (e.g. Circ 2014 recommendations: Avoidance of burst exercise, competitive training,weight-lifting)  Family Risk   -She has underwent genetic testing and carries MYBPC3 gene mutation.   - Discussed in detail with patient family screening   Risk stratification for sudden cardiac death   She had out of hospital resuscitated VF arrest and had S-ICD implanted as secondary prevention. Normal device function. Approaching RRT. Discussed generator change in detailed including indications, risks, and benefits. She is agreeable to pursue generator change when RRT is reached  Follow up 3 months post generator change.        History of Present Illness/Subjective    Ms. Lauren Lewis is a 53 year old female who comes in today for EP consultation of HCM, S-ICD cares.    Ms. Lewis is a 53 year old female who has a medical history significant for HCM  With MYBPC3 mutation, VF arrest s/p secondary prevention S-ICD 9/2015.    She has a family history of HCM. She originally saw Dr. Bartholomew more than 35 years ago who felt that she likely had apical hypertrophy. Her echo was normal at that time. She had remained asymptomatic. However, in 9/2015 she suffered an out of  hospital VF arrest with ROSC after an external defibrillation by EMS. She suffered a liver laceration as a result of the CPR requiring transfusion of 4 units of PRBCs and hepatic artery embolization.  Echo once again was within normal limits but MRI confirmed the presence of extensive fibrosis at the apex of the left ventricle consistent with hypertrophic cardiomopathy in that area. Coronary angiogram was normal. She had S-ICD implanted as secondary prevention of SCD.     She presents today to establish  Care. She reports feeling well. She denies chest discomfort, palpitations, abdominal fullness/bloating or peripheral edema, shortness of breath, paroxysmal nocturnal dyspnea, orthopnea, lightheadedness, dizziness, pre-syncope, or syncope.  An echo shows LVEF 55-60%, normal LV size, normal RV function, and no significant valvular abnormalities. Presenting 12 lead ECG shows SR Vent Rate 64 bpm,  ms, QRS 76 ms, QTc 429 ms. Device interrogation shows normal device function, stable lead parameters, no arrhythmias recorded,and RRT 7%. Current cardiac medications include: Spironolactone, Toprol XL, ad Simvastatin.       I have reviewed and updated the patient's Past Medical History, Social History, Family History and Medication List.     Cardiographics (Personally Reviewed) :   CMR 2015:  1. Overall preserved left ventricular systolic function with an EF of 60% and an apical area of severe hypokinesis to aneurysm with near transmural thickness gadolinium uptake. The mid ventricular portion of the ventricle is hyperdynamic. This is consistent with either a hypertrophic cardiomyopathy in a previously apical predominant fashion with subsequent myocardial fibrosis either secondary to infarction or HCM-related process. The distinction between an apical hypertrophic cardiomyopathy with or without superimposed apical myocardial infarction will be difficult to make without coronary angiography.   2. Borderline to low  normal right ventricular systolic function in the setting of prolonged CPR, consider RV contusion. This is difficult to prove without a repeat study in a few weeks assessing for any change in right ventricular size or contraction.   3. Mitral regurgitation, mild.     Echo 2017  Normal left ventricular size, thickness, and function. EF 55-60%. Normal left  ventricular filling for age. No regional wall motion abnormalities are seen.  Normal right ventricular size and function.  No significant valvular pathology.  The inferior vena cava was normal in size with preserved respiratory variability. No pericardial effusion.   No change from prior study 12/13/16.     Echo 12/6/19  Left ventricular function, chamber size, wall motion, and wall thickness are normal.The EF is 60-65%.  Right ventricular function, chamber size, wall motion, and thickness are normal.  This study was compared with the study from 12/12/17: There has been no change.     Echo 1/28/22  Global and regional left ventricular function is normal with an EF of 55-60%.  Left ventricular wall thickness is normal.  Global right ventricular function is normal.  Mild right ventricular dilation is present.  This study was compared with the study from 12/6/2019 .There has been no Change.    Echo 4/9/24:  terpretation Summary  Genotype + HCM, SQ ICD  Left ventricular size, wall motion and function are normal. The ejection  fraction is 55-60%. Wall thickness is <1 cm. There is no LV outflow  obstruction.  Global right ventricular function is normal.  No significant valvular abnormalities.  Mean RA pressure normal at 3 mmHg.  No pericardial effusion is present.     Physical Examination   /85 (BP Location: Left arm, Patient Position: Sitting, Cuff Size: Adult Regular)   Pulse 67   Wt 87.2 kg (192 lb 4.8 oz)   LMP 09/28/2015 (Exact Date)   SpO2 97%   BMI 32.00 kg/m    Wt Readings from Last 3 Encounters:   10/23/22 81.6 kg (180 lb)   01/28/22 82.5 kg (181 lb  14.4 oz)   12/06/19 83.8 kg (184 lb 11.2 oz)     General Appearance:   Alert, well-appearing and in no acute distress.   HEENT: Atraumatic, normocephalic. PERRL.  MMM.   Chest/Lungs:   Respirations unlabored.  Lungs are clear to auscultation.   Cardiovascular:   Regular rate and rhythm.  S1/S2. No murmur.    Abdomen:  Soft, nontender, nondistended.   Extremities: No cyanosis or clubbing. No edema.    Musculoskeletal: Moves all extremities.  Gait normal.   Skin: Warm, dry, intact.    Neurologic: Mood and affect are appropriate.  Alert and oriented to person, place, time, and situation.          Medications  Allergies   Current Outpatient Medications   Medication Sig Dispense Refill    escitalopram (LEXAPRO) 10 MG tablet Take 10 mg by mouth      levothyroxine (SYNTHROID) 100 MCG tablet Take 1 tablet by mouth daily      metoprolol succinate ER (TOPROL-XL) 100 MG 24 hr tablet Take 1 tablet (100 mg) by mouth daily 90 tablet 0    OMEPRAZOLE Take 40 mg by mouth daily      simvastatin (ZOCOR) 20 MG tablet Take 20 mg by mouth At Bedtime       spironolactone (ALDACTONE) 25 MG tablet Take 1 tablet (25 mg) by mouth daily 90 tablet 0    VITAMIN D, CHOLECALCIFEROL, PO Take 1 capsule by mouth daily      No Known Allergies      Lab Results (Personally Reviewed)    Chemistry/lipid CBC Cardiac Enzymes/BNP/TSH/INR   Lab Results   Component Value Date    BUN 13.9 04/09/2024     04/09/2024    CO2 25 04/09/2024     Creatinine   Date Value Ref Range Status   04/09/2024 1.16 (H) 0.51 - 0.95 mg/dL Final   01/22/2020 1.07 mg/dL Final       Lab Results   Component Value Date    CHOL 165 04/09/2024    HDL 49 (L) 04/09/2024    LDL 96 04/09/2024      Lab Results   Component Value Date    WBC 5.9 04/09/2024    HGB 15.6 04/09/2024    HCT 45.5 04/09/2024    MCV 89 04/09/2024     04/09/2024    Lab Results   Component Value Date    TSH 4.20 (H) 12/12/2017        The patient states understanding and is agreeable with the plan.   Gael  MD [facility]  Cardiology - Electrophysiology      Total time spent on patient visit, reviewing notes, imaging, labs, orders, and completing necessary documentation: 45 minutes.

## 2024-04-09 NOTE — PATIENT INSTRUCTIONS
You were seen today in the Adult Congenital and Cardiovascular Genetics Clinic at the [facility].    Cardiology Providers you saw during your visit:  Gael Michel MD and Tyshawn Pablo MD    Diagnosis:  HCM    Results:  Gael Michel MD and Tyshawn Pablo MD reviewed the results of your EKG, device check, and labs prior testing today in clinic.    Recommendations for you:    Complete Generator change when your ICD reaches elective replacement indicator (KAMRYN). The device team will be in touch with you.       General Cardiac Recommendations:  Continue to eat a heart healthy, low salt diet.  Continue to get 20-30 minutes of aerobic activity, 4-5 days per week.  Examples of aerobic activity include walking, running, swimming, cycling, etc.  Continue to observe good oral hygiene, with regular dental visits.      SBE prophylaxis:   Yes____  No_x___    Exercise restrictions:   Yes__X__  No____         If yes, list restrictions:  Must be allowed to rest if fatigued or SOB      FASTING CHOLESTEROL was checked in the last 5 years YES_x___  NO____ (2024)  If no, please follow up with your primary care physician. You should have a cholesterol screening every 5 years.      Follow-up:  Follow up with Dr. Pablo in 2 years with echocardiogram and device check prior.     If you have questions or concerns please contact us at:    Santi Mensah RN, BSN     Rebecca Hurd and Sylvie Meza (Scheduling)  Nurse Care Coordinator     Clinic   CV Genetics      Adult Congenital and CV Genetic  [facility]  (P) [phone]     (P) [phone]  (F) [phone]     (F) [phone]      For after hours urgent needs, call [phone] and ask to speak to the Adult Congenital Physician on call.  Mention Job Code 0401.    For emergencies call 326.    [facility]  [place]  Mail Code [id number]  [address] [place]  [address]

## 2024-04-09 NOTE — PATIENT INSTRUCTIONS
You were seen today in the Adult Congenital and Cardiovascular Genetics Clinic at the [facility].    Cardiology Providers you saw during your visit:  Gael Michel MD and Tyshawn Pablo MD    Diagnosis:  HCM    Results:  Gael Michel MD and Tyshawn Pablo MD reviewed the results of your EKG, device check, and labs prior testing today in clinic.    Recommendations for you:    Complete Generator change when your ICD reaches elective replacement indicator (KAMRYN). The device team will be in touch with you.       General Cardiac Recommendations:  Continue to eat a heart healthy, low salt diet.  Continue to get 20-30 minutes of aerobic activity, 4-5 days per week.  Examples of aerobic activity include walking, running, swimming, cycling, etc.  Continue to observe good oral hygiene, with regular dental visits.      SBE prophylaxis:   Yes____  No_x___    Exercise restrictions:   Yes__X__  No____         If yes, list restrictions:  Must be allowed to rest if fatigued or SOB      FASTING CHOLESTEROL was checked in the last 5 years YES_x___  NO____ (2024)  If no, please follow up with your primary care physician. You should have a cholesterol screening every 5 years.      Follow-up:  Follow up with Dr. Pablo in 2 years with echocardiogram and device check prior.     If you have questions or concerns please contact us at:    Santi Mensah RN, BSN     Rebecca Hurd and Sylvie Meza (Scheduling)  Nurse Care Coordinator     Clinic   CV Genetics      Adult Congenital and CV Genetic  [facility]  (P) [phone]     (P) [phone]  (F) [phone]     (F) [phone]      For after hours urgent needs, call [phone] and ask to speak to the Adult Congenital Physician on call.  Mention Job Code 0401.    For emergencies call 341.    [facility]  [place]  Mail Code [id number]  [address] [place]  [address]

## 2024-04-09 NOTE — LETTER
4/9/2024      RE: Lauren Lewis  [address] [place] [address]       Dear Colleague,    Thank you for the opportunity to participate in the care of your patient, Lauren Lewis, at the [facility] HEART CLINIC [place] at [facility]. Please see a copy of my visit note below.    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     She was previously followed by Dr. Belle Paz, whom she last saw 1/28/22. She was doing well at that time and plan was for follow up in 2 years.     She walks regularly and has not noticed new symptoms. She is tolerating medications well. She denies any chest pain, dyspnea at rest or with exertion, palpitations, PND, orthopnea, peripheral edema, lightheadedness, or syncope.       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  Fortunately, her children were found to NOT have this gene mutation.    SOCIAL  HISTORY:  Social History     Social History     Marital Status:      Spouse Name: N/A     Number of Children: N/A     Years of Education: N/A     Social History Main Topics     Smoking status: Never Smoker      Smokeless tobacco: None     Alcohol Use: No     Drug Use: No     Sexual Activity:     Partners: Male     Other Topics Concern     None     Social History Narrative       CURRENT MEDICATIONS:  Current Outpatient Medications   Medication Sig Dispense Refill     escitalopram (LEXAPRO) 10 MG tablet Take 10 mg by mouth       levothyroxine (SYNTHROID) 100 MCG tablet Take 1 tablet by mouth daily       metoprolol succinate ER (TOPROL-XL) 100 MG 24 hr tablet Take 1 tablet (100 mg) by mouth daily 90 tablet 0     OMEPRAZOLE Take 40 mg by mouth daily       simvastatin (ZOCOR) 20 MG tablet Take 20 mg by mouth At Bedtime        spironolactone (ALDACTONE) 25 MG tablet Take 1 tablet (25 mg) by mouth daily 90 tablet 0     VITAMIN D, CHOLECALCIFEROL, PO Take 1 capsule by mouth daily            EXAM:  /85 (BP Location: Left arm, Patient Position: Sitting, Cuff Size: Adult Regular)   Pulse 67   Wt 87.1 kg (192 lb)   LMP 09/28/2015 (Exact Date)   SpO2 97%   BMI 31.95 kg/m    General: appears comfortable, alert and articulate  Head: normocephalic, atraumatic  Eyes: anicteric sclera, EOMI  Neck: no adenopathy, 2+ carotids  Heart: regular, S1/S2, no murmur, gallop, rub, estimated JVP~7 cm H2O  Lungs: clear, no rales or wheezing  Abdomen: soft, non-tender, bowel sounds present, no hepatomegaly  Extremities: warm, well perfused.  no clubbing, cyanosis or edema  Neurological: normal speech and affect, no gross motor deficits    Genetic testing 10/21/15 ([employer]): Lauren CARRIES her family's MYBPC3 mutation (c.927-2 A>G)  NM_000256.3(MYBPC3):c.927-2A>G (https://www.ncbi.nlm.nih.gov/clinvar/HYD959974711/)          I personally visualized and interpreted:  Echo 04/09/24: Known apical variant HCM. Normal LV/RV function,  LVEF=55-60%. No significant valvular abnormalities. No detectable resting LVOT obstruction or ALLIE. No change from 1/28/22.     S-ICD interrogation 04/09/24: No arrhythmias. 7% battery to RRT.    ECG 04/09/24: sinus, low voltage, nonspecific ST/T abnomality. No significant change from 1/28/22.     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     Non-obstructive HCM, apical variant due to MYBPC3 mutation  VF arrest s/p secondary prevention S-ICD  Lauren continues to feel well and is stably euvolemic and well-perfused without changes in exertional capacity. S-ICD interrogation confirms no recent significant arrhythmias.  Echo today with stable findings.  BP today well controlled.      -seen by Dr. Michel today to coordinate S-ICD generator change  -Continue metoprolol xl 100 mg daily and spironolactone 25mg daily   -she is aware of exercise recommendations in HCM  -both of her sons have been tested and are negative for the familial MYBPC3 mutation  -she is also aware of recommendation for all first degree relatives to undergo clinical screening unless they opt to pursue genetic testing and are found to be gene negative as with her children,       Follow up:  Follow-up in 2 years with TTE, S-ICD interrogation, ECG, and labs prior.Will be happy to see sooner if change in clinical status or new questions/concerns arise.    I spent a total of 41 minutes on the day of the visit.   Time spent by me doing chart review, history and exam, documentation and further activities per the note      Tyshawn Pablo MD  Cardiology      Please do not hesitate to contact me if you have any questions/concerns.     Sincerely,     Tyshawn Pablo MD

## 2024-04-09 NOTE — TELEPHONE ENCOUNTER
4/9/2024 10:29AM Jaye Sibley, device tech confirmed scheduled appointment:  Date: 4/9/2024  Time: 10:15AM  Visit type: New EP  Provider: Dr. Michel  [address], [place] [address]  Testing/imaging: NA  Additional notes: 4/9 Maryjo from device team asked if I could schedule doublebook appt today w/ Dr. Michel. Ok'd w/ Dr. Michel. OLAYINKA Luna 4/9/2024 10:29AM

## 2024-04-09 NOTE — NURSING NOTE
Chief Complaint   Patient presents with    New Patient     New genetic heart   Vitals were taken, medications reconciled, and EKG was performed.    Adrianna Knight CNA  11:20 AM

## 2024-04-09 NOTE — PROGRESS NOTES
"Optimal Vascular Metrics    Blood Pressure   {BP < 140/90:8061621::\"BP < 140/90 Yes\"}    On Aspirin  {On Aspirin Yes/No:7319641::\"Yes\"}    On Statin  {On Statin Yes/No:6209730::\"Yes\"}    Tobacco use  {Tobacco use Yes/No:1042476::\"No\"}    "

## 2024-04-09 NOTE — PROGRESS NOTES
"Optimal Vascular Metrics    Blood Pressure   {BP < 140/90:2976841::\"BP < 140/90 Yes\"}    On Aspirin  {On Aspirin Yes/No:5725244::\"Yes\"}    On Statin  {On Statin Yes/No:8544157::\"Yes\"}    Tobacco use  {Tobacco use Yes/No:6030429::\"No\"}    "

## 2024-04-10 LAB
ATRIAL RATE - MUSE: 64 BPM
DIASTOLIC BLOOD PRESSURE - MUSE: NORMAL MMHG
INTERPRETATION ECG - MUSE: NORMAL
P AXIS - MUSE: 22 DEGREES
PR INTERVAL - MUSE: 168 MS
QRS DURATION - MUSE: 76 MS
QT - MUSE: 416 MS
QTC - MUSE: 429 MS
R AXIS - MUSE: 1 DEGREES
SYSTOLIC BLOOD PRESSURE - MUSE: NORMAL MMHG
T AXIS - MUSE: -5 DEGREES
VENTRICULAR RATE- MUSE: 64 BPM

## 2024-04-11 LAB
MDC_IDC_LEAD_CONNECTION_STATUS: NORMAL
MDC_IDC_LEAD_IMPLANT_DT: NORMAL
MDC_IDC_LEAD_LOCATION: NORMAL
MDC_IDC_LEAD_LOCATION_DETAIL_1: NORMAL
MDC_IDC_LEAD_MFG: NORMAL
MDC_IDC_LEAD_MODEL: NORMAL
MDC_IDC_LEAD_POLARITY_TYPE: NORMAL
MDC_IDC_LEAD_SERIAL: NORMAL
MDC_IDC_LEAD_SPECIAL_FUNCTION: NORMAL
MDC_IDC_MSMT_BATTERY_DTM: NORMAL
MDC_IDC_MSMT_BATTERY_REMAINING_PERCENTAGE: 7 %
MDC_IDC_MSMT_BATTERY_STATUS: NORMAL
MDC_IDC_PG_IMPLANT_DTM: NORMAL
MDC_IDC_PG_MFG: NORMAL
MDC_IDC_PG_MODEL: NORMAL
MDC_IDC_PG_SERIAL: NORMAL
MDC_IDC_PG_TYPE: NORMAL
MDC_IDC_SESS_CLINIC_NAME: NORMAL
MDC_IDC_SESS_DTM: NORMAL
MDC_IDC_SESS_TYPE: NORMAL
MDC_IDC_SET_ZONE_DETECTION_INTERVAL: 240 MS
MDC_IDC_SET_ZONE_DETECTION_INTERVAL: 272 MS
MDC_IDC_SET_ZONE_STATUS: NORMAL
MDC_IDC_SET_ZONE_STATUS: NORMAL
MDC_IDC_SET_ZONE_TYPE: NORMAL
MDC_IDC_SET_ZONE_TYPE: NORMAL
MDC_IDC_SET_ZONE_VENDOR_TYPE: NORMAL
MDC_IDC_SET_ZONE_VENDOR_TYPE: NORMAL
MDC_IDC_STAT_EPISODE_RECENT_COUNT: 0
MDC_IDC_STAT_EPISODE_RECENT_COUNT_DTM_END: NORMAL
MDC_IDC_STAT_EPISODE_RECENT_COUNT_DTM_START: NORMAL
MDC_IDC_STAT_EPISODE_TOTAL_COUNT: 0
MDC_IDC_STAT_EPISODE_TOTAL_COUNT_DTM_END: NORMAL
MDC_IDC_STAT_EPISODE_TOTAL_COUNT_DTM_START: NORMAL
MDC_IDC_STAT_EPISODE_TYPE: NORMAL
MDC_IDC_STAT_EPISODE_TYPE: NORMAL
MDC_IDC_STAT_TACHYTHERAPY_RECENT_DTM_END: NORMAL
MDC_IDC_STAT_TACHYTHERAPY_RECENT_DTM_START: NORMAL
MDC_IDC_STAT_TACHYTHERAPY_SHOCKS_DELIVERED_RECENT: 0
MDC_IDC_STAT_TACHYTHERAPY_SHOCKS_DELIVERED_TOTAL: 1
MDC_IDC_STAT_TACHYTHERAPY_TOTAL_DTM_END: NORMAL
MDC_IDC_STAT_TACHYTHERAPY_TOTAL_DTM_START: NORMAL

## 2024-07-09 ENCOUNTER — ANCILLARY PROCEDURE (OUTPATIENT)
Dept: CARDIOLOGY | Facility: CLINIC | Age: 54
End: 2024-07-09
Attending: INTERNAL MEDICINE
Payer: COMMERCIAL

## 2024-07-09 DIAGNOSIS — I46.9 CARDIAC ARREST (H): ICD-10-CM

## 2024-07-09 DIAGNOSIS — Z95.810 AICD (AUTOMATIC CARDIOVERTER/DEFIBRILLATOR) PRESENT: ICD-10-CM

## 2024-07-09 PROCEDURE — 93296 REM INTERROG EVL PM/IDS: CPT

## 2024-07-09 PROCEDURE — 93295 DEV INTERROG REMOTE 1/2/MLT: CPT | Performed by: INTERNAL MEDICINE

## 2024-07-19 ENCOUNTER — PRE VISIT (OUTPATIENT)
Dept: CARDIOLOGY | Facility: CLINIC | Age: 54
End: 2024-07-19
Payer: COMMERCIAL

## 2024-07-24 LAB
MDC_IDC_LEAD_CONNECTION_STATUS: NORMAL
MDC_IDC_LEAD_IMPLANT_DT: NORMAL
MDC_IDC_LEAD_LOCATION: NORMAL
MDC_IDC_LEAD_LOCATION_DETAIL_1: NORMAL
MDC_IDC_LEAD_MFG: NORMAL
MDC_IDC_LEAD_MODEL: NORMAL
MDC_IDC_LEAD_POLARITY_TYPE: NORMAL
MDC_IDC_LEAD_SERIAL: NORMAL
MDC_IDC_LEAD_SPECIAL_FUNCTION: NORMAL
MDC_IDC_MSMT_BATTERY_DTM: NORMAL
MDC_IDC_MSMT_BATTERY_REMAINING_PERCENTAGE: 4 %
MDC_IDC_MSMT_BATTERY_STATUS: NORMAL
MDC_IDC_PG_IMPLANT_DTM: NORMAL
MDC_IDC_PG_MFG: NORMAL
MDC_IDC_PG_MODEL: NORMAL
MDC_IDC_PG_SERIAL: NORMAL
MDC_IDC_PG_TYPE: NORMAL
MDC_IDC_SESS_CLINIC_NAME: NORMAL
MDC_IDC_SESS_DTM: NORMAL
MDC_IDC_SESS_TYPE: NORMAL
MDC_IDC_SET_ZONE_DETECTION_INTERVAL: 240 MS
MDC_IDC_SET_ZONE_DETECTION_INTERVAL: 273 MS
MDC_IDC_SET_ZONE_STATUS: NORMAL
MDC_IDC_SET_ZONE_STATUS: NORMAL
MDC_IDC_SET_ZONE_TYPE: NORMAL
MDC_IDC_SET_ZONE_TYPE: NORMAL
MDC_IDC_SET_ZONE_VENDOR_TYPE: NORMAL
MDC_IDC_SET_ZONE_VENDOR_TYPE: NORMAL
MDC_IDC_STAT_AT_BURDEN_PERCENT: 0 %
MDC_IDC_STAT_EPISODE_RECENT_COUNT: 0
MDC_IDC_STAT_EPISODE_RECENT_COUNT_DTM_END: NORMAL
MDC_IDC_STAT_EPISODE_RECENT_COUNT_DTM_START: NORMAL
MDC_IDC_STAT_EPISODE_TOTAL_COUNT: 0
MDC_IDC_STAT_EPISODE_TOTAL_COUNT_DTM_END: NORMAL
MDC_IDC_STAT_EPISODE_TOTAL_COUNT_DTM_START: NORMAL
MDC_IDC_STAT_EPISODE_TYPE: NORMAL
MDC_IDC_STAT_EPISODE_VENDOR_TYPE: NORMAL
MDC_IDC_STAT_TACHYTHERAPY_RECENT_DTM_END: NORMAL
MDC_IDC_STAT_TACHYTHERAPY_RECENT_DTM_START: NORMAL
MDC_IDC_STAT_TACHYTHERAPY_SHOCKS_DELIVERED_RECENT: 0
MDC_IDC_STAT_TACHYTHERAPY_SHOCKS_DELIVERED_TOTAL: 1
MDC_IDC_STAT_TACHYTHERAPY_TOTAL_DTM_END: NORMAL
MDC_IDC_STAT_TACHYTHERAPY_TOTAL_DTM_START: NORMAL

## 2024-08-01 NOTE — TELEPHONE ENCOUNTER
Action Rebecca Hurd CMA on 8/1/2024 at 3:00 PM    Action Taken Requested images from [facility] for the following imaging:    [facility] 9/28/20215

## 2024-10-16 ENCOUNTER — ANCILLARY PROCEDURE (OUTPATIENT)
Dept: CARDIOLOGY | Facility: CLINIC | Age: 54
End: 2024-10-16
Attending: INTERNAL MEDICINE
Payer: COMMERCIAL

## 2024-10-16 DIAGNOSIS — I46.9 CARDIAC ARREST (H): ICD-10-CM

## 2024-10-16 DIAGNOSIS — Z95.810 AICD (AUTOMATIC CARDIOVERTER/DEFIBRILLATOR) PRESENT: ICD-10-CM

## 2024-10-16 LAB
MDC_IDC_LEAD_CONNECTION_STATUS: NORMAL
MDC_IDC_LEAD_IMPLANT_DT: NORMAL
MDC_IDC_LEAD_LOCATION: NORMAL
MDC_IDC_LEAD_LOCATION_DETAIL_1: NORMAL
MDC_IDC_LEAD_MFG: NORMAL
MDC_IDC_LEAD_MODEL: NORMAL
MDC_IDC_LEAD_POLARITY_TYPE: NORMAL
MDC_IDC_LEAD_SERIAL: NORMAL
MDC_IDC_LEAD_SPECIAL_FUNCTION: NORMAL
MDC_IDC_MSMT_BATTERY_DTM: NORMAL
MDC_IDC_MSMT_BATTERY_REMAINING_PERCENTAGE: 2 %
MDC_IDC_MSMT_BATTERY_STATUS: NORMAL
MDC_IDC_PG_IMPLANT_DTM: NORMAL
MDC_IDC_PG_MFG: NORMAL
MDC_IDC_PG_MODEL: NORMAL
MDC_IDC_PG_SERIAL: NORMAL
MDC_IDC_PG_TYPE: NORMAL
MDC_IDC_SESS_CLINIC_NAME: NORMAL
MDC_IDC_SESS_DTM: NORMAL
MDC_IDC_SESS_TYPE: NORMAL
MDC_IDC_SET_ZONE_DETECTION_INTERVAL: 240 MS
MDC_IDC_SET_ZONE_DETECTION_INTERVAL: 273 MS
MDC_IDC_SET_ZONE_STATUS: NORMAL
MDC_IDC_SET_ZONE_STATUS: NORMAL
MDC_IDC_SET_ZONE_TYPE: NORMAL
MDC_IDC_SET_ZONE_TYPE: NORMAL
MDC_IDC_SET_ZONE_VENDOR_TYPE: NORMAL
MDC_IDC_SET_ZONE_VENDOR_TYPE: NORMAL
MDC_IDC_STAT_AT_BURDEN_PERCENT: 0 %
MDC_IDC_STAT_EPISODE_RECENT_COUNT: 0
MDC_IDC_STAT_EPISODE_RECENT_COUNT_DTM_END: NORMAL
MDC_IDC_STAT_EPISODE_RECENT_COUNT_DTM_START: NORMAL
MDC_IDC_STAT_EPISODE_TOTAL_COUNT: 0
MDC_IDC_STAT_EPISODE_TOTAL_COUNT_DTM_END: NORMAL
MDC_IDC_STAT_EPISODE_TOTAL_COUNT_DTM_START: NORMAL
MDC_IDC_STAT_EPISODE_TYPE: NORMAL
MDC_IDC_STAT_EPISODE_VENDOR_TYPE: NORMAL
MDC_IDC_STAT_TACHYTHERAPY_RECENT_DTM_END: NORMAL
MDC_IDC_STAT_TACHYTHERAPY_RECENT_DTM_START: NORMAL
MDC_IDC_STAT_TACHYTHERAPY_SHOCKS_DELIVERED_RECENT: 0
MDC_IDC_STAT_TACHYTHERAPY_SHOCKS_DELIVERED_TOTAL: 1
MDC_IDC_STAT_TACHYTHERAPY_TOTAL_DTM_END: NORMAL
MDC_IDC_STAT_TACHYTHERAPY_TOTAL_DTM_START: NORMAL

## 2024-10-16 PROCEDURE — 93296 REM INTERROG EVL PM/IDS: CPT

## 2024-10-16 PROCEDURE — 93295 DEV INTERROG REMOTE 1/2/MLT: CPT | Performed by: INTERNAL MEDICINE

## 2024-12-10 ENCOUNTER — ANCILLARY PROCEDURE (OUTPATIENT)
Dept: CARDIOLOGY | Facility: CLINIC | Age: 54
End: 2024-12-10
Attending: INTERNAL MEDICINE
Payer: COMMERCIAL

## 2024-12-10 DIAGNOSIS — Z95.810 AICD (AUTOMATIC CARDIOVERTER/DEFIBRILLATOR) PRESENT: ICD-10-CM

## 2024-12-10 DIAGNOSIS — I46.9 CARDIAC ARREST (H): ICD-10-CM

## 2024-12-11 LAB
MDC_IDC_LEAD_CONNECTION_STATUS: NORMAL
MDC_IDC_LEAD_IMPLANT_DT: NORMAL
MDC_IDC_LEAD_LOCATION: NORMAL
MDC_IDC_LEAD_LOCATION_DETAIL_1: NORMAL
MDC_IDC_LEAD_MFG: NORMAL
MDC_IDC_LEAD_MODEL: NORMAL
MDC_IDC_LEAD_POLARITY_TYPE: NORMAL
MDC_IDC_LEAD_SERIAL: NORMAL
MDC_IDC_LEAD_SPECIAL_FUNCTION: NORMAL
MDC_IDC_MSMT_BATTERY_DTM: NORMAL
MDC_IDC_MSMT_BATTERY_STATUS: NORMAL
MDC_IDC_PG_IMPLANT_DTM: NORMAL
MDC_IDC_PG_MFG: NORMAL
MDC_IDC_PG_MODEL: NORMAL
MDC_IDC_PG_SERIAL: NORMAL
MDC_IDC_PG_TYPE: NORMAL
MDC_IDC_SESS_CLINIC_NAME: NORMAL
MDC_IDC_SESS_DTM: NORMAL
MDC_IDC_SESS_TYPE: NORMAL
MDC_IDC_SET_ZONE_DETECTION_INTERVAL: 240 MS
MDC_IDC_SET_ZONE_DETECTION_INTERVAL: 273 MS
MDC_IDC_SET_ZONE_STATUS: NORMAL
MDC_IDC_SET_ZONE_STATUS: NORMAL
MDC_IDC_SET_ZONE_TYPE: NORMAL
MDC_IDC_SET_ZONE_TYPE: NORMAL
MDC_IDC_SET_ZONE_VENDOR_TYPE: NORMAL
MDC_IDC_SET_ZONE_VENDOR_TYPE: NORMAL
MDC_IDC_STAT_EPISODE_RECENT_COUNT: 0
MDC_IDC_STAT_EPISODE_RECENT_COUNT_DTM_END: NORMAL
MDC_IDC_STAT_EPISODE_RECENT_COUNT_DTM_START: NORMAL
MDC_IDC_STAT_EPISODE_TOTAL_COUNT: 0
MDC_IDC_STAT_EPISODE_TOTAL_COUNT_DTM_END: NORMAL
MDC_IDC_STAT_EPISODE_TOTAL_COUNT_DTM_START: NORMAL
MDC_IDC_STAT_EPISODE_TYPE: NORMAL
MDC_IDC_STAT_EPISODE_TYPE: NORMAL
MDC_IDC_STAT_TACHYTHERAPY_RECENT_DTM_END: NORMAL
MDC_IDC_STAT_TACHYTHERAPY_RECENT_DTM_START: NORMAL
MDC_IDC_STAT_TACHYTHERAPY_SHOCKS_DELIVERED_RECENT: 0
MDC_IDC_STAT_TACHYTHERAPY_SHOCKS_DELIVERED_TOTAL: 1
MDC_IDC_STAT_TACHYTHERAPY_TOTAL_DTM_END: NORMAL
MDC_IDC_STAT_TACHYTHERAPY_TOTAL_DTM_START: NORMAL

## 2024-12-13 ENCOUNTER — CARE COORDINATION (OUTPATIENT)
Dept: CARDIOLOGY | Facility: CLINIC | Age: 54
End: 2024-12-13
Payer: COMMERCIAL

## 2024-12-13 DIAGNOSIS — Z95.810 AICD (AUTOMATIC CARDIOVERTER/DEFIBRILLATOR) PRESENT: ICD-10-CM

## 2024-12-13 DIAGNOSIS — I46.9 CARDIAC ARREST (H): Primary | ICD-10-CM

## 2024-12-13 DIAGNOSIS — I42.1 HOCM (HYPERTROPHIC OBSTRUCTIVE CARDIOMYOPATHY) (H): ICD-10-CM

## 2024-12-13 RX ORDER — LIDOCAINE 40 MG/G
CREAM TOPICAL
Status: CANCELLED | OUTPATIENT
Start: 2024-12-13

## 2024-12-13 RX ORDER — SODIUM CHLORIDE 9 MG/ML
INJECTION, SOLUTION INTRAVENOUS CONTINUOUS
Status: CANCELLED | OUTPATIENT
Start: 2024-12-13

## 2024-12-13 RX ORDER — CEFAZOLIN SODIUM 2 G/50ML
2 SOLUTION INTRAVENOUS
Status: CANCELLED | OUTPATIENT
Start: 2024-12-13

## 2025-01-06 ENCOUNTER — TELEPHONE (OUTPATIENT)
Dept: CARDIOLOGY | Facility: CLINIC | Age: 55
End: 2025-01-06
Payer: COMMERCIAL

## 2025-01-06 NOTE — TELEPHONE ENCOUNTER
Spoke with patient regarding information and instructions for upcoming ICD generator change on verified date of procedure: 1/9/25 starting at 12pm. Patient was given instructions regarding gen change. Discussed purpose, preparation, and procedure with patient. Patient received an instruction letter today for reference. Patient verbalized understanding of information reviewed for pre and post procedure and agreed to call with further questions or concerns.     You are scheduled for an ICD generator change, at [facility]. The hospital is located at [address] [place] [address] on [place] the [place].  If you need to cancel this procedure, please call [phone].         Visitor Policy: Two visitors.        Date:__January 9, 2025____  Time: __12p_____To the [facility] Waiting Room at the [facility]     1. Please review the attached instructions on showering before your procedure at the end of this letter.  2. Your history and physical will be completed by our advanced practice provider when you arrive.  3. Do not eat for 8 hours prior to arrival, you can drink water up until 2 hours prior.  4. Medications to continue:  - Anticoagulant (_N/A_).  - Take all meds as prescribed, except for those noted below.  5. Medications to hold:               - None  6. You will likely discharge the same day and need a .     Post-Procedure Instructions  Wound Care  Keep your incision (surgery wound) dry for 3 days.  After 3 days, you may remove the outer bandage.  Keep the strips of tape on.  They will be removed at your clinic visit.  Check for signs of infection each day.  These include increased redness, swelling, drainage or a fever over 101 F (38.3 C).  Call us immediately if you see any of   these signs.  If there are no signs of infection, you may shower in 3 days.  Do not submerge the incision (in a bath tub, hot tub, or swimming pool) until fully healed.  Pain  You may have mild to  moderate pain for 3 to 5 days.  Take acetaminophen (Tylenol) or ibuprofen (Advil) for the pain.  Call us if the pain is severe or lasts more than 5 days.  Activity  You should slowly go back to your normal activities after 24 hours.  Healing will take 4 to 6 weeks.  No driving for 24 hours  For 3 days, do not raise your affected arm above your shoulder  For 10 days, do not use your affected arm to push, pull, or lift anything over 10 pounds  Avoid climbing a ladder alone.  It is best to stay within 4 feet of the ground.  Avoid anything that may cause rough contact or a hard hit to your chest.  This includes football, hockey, and other contact sports.  Do not go swimming or boating alone.  Follow Up Appointments Date & Time   7-10 day incision check with device clinic  January 16, 2025  9am with device team [address] [place] [address] 3rd floor   3 month visit with device check & provider May 23, 2025 9a device and 10a with Paulina Henriquez NP      If you have further questions, please utilize Sina to contact us.   If your question concerns the above instructions, contact:  Santi Mensah RN, BSN, PHN  RN Care Coordinator  CV Genetics  Phone: [phone]  Fax: [phone]      If your question concerns the schedule/appointment times, contact:  [facility] Procedure   [phone]     Device Clinic (Pacemakers, Defibrillators, Loop Recorders)  [phone]     Santi Mensah RN

## 2025-01-08 ENCOUNTER — ANESTHESIA EVENT (OUTPATIENT)
Dept: CARDIOLOGY | Facility: CLINIC | Age: 55
End: 2025-01-08
Payer: COMMERCIAL

## 2025-01-09 ENCOUNTER — HOSPITAL ENCOUNTER (OUTPATIENT)
Facility: CLINIC | Age: 55
Discharge: HOME | End: 2025-01-09
Attending: INTERNAL MEDICINE | Admitting: INTERNAL MEDICINE
Payer: COMMERCIAL

## 2025-01-09 ENCOUNTER — TRANSFERRED RECORDS (OUTPATIENT)
Dept: HEALTH INFORMATION MANAGEMENT | Facility: CLINIC | Age: 55
End: 2025-01-09

## 2025-01-09 ENCOUNTER — ANESTHESIA (OUTPATIENT)
Dept: CARDIOLOGY | Facility: CLINIC | Age: 55
End: 2025-01-09
Payer: COMMERCIAL

## 2025-01-09 VITALS
BODY MASS INDEX: 31.82 KG/M2 | OXYGEN SATURATION: 98 % | TEMPERATURE: 98.2 F | HEART RATE: 71 BPM | RESPIRATION RATE: 15 BRPM | DIASTOLIC BLOOD PRESSURE: 84 MMHG | WEIGHT: 191.2 LBS | SYSTOLIC BLOOD PRESSURE: 117 MMHG

## 2025-01-09 DIAGNOSIS — I46.9 CARDIAC ARREST (H): ICD-10-CM

## 2025-01-09 DIAGNOSIS — Z95.810 S/P ICD (INTERNAL CARDIAC DEFIBRILLATOR) PROCEDURE: Primary | ICD-10-CM

## 2025-01-09 DIAGNOSIS — I42.1 HOCM (HYPERTROPHIC OBSTRUCTIVE CARDIOMYOPATHY) (H): ICD-10-CM

## 2025-01-09 DIAGNOSIS — Z95.810 AICD (AUTOMATIC CARDIOVERTER/DEFIBRILLATOR) PRESENT: ICD-10-CM

## 2025-01-09 LAB
ANION GAP SERPL CALCULATED.3IONS-SCNC: 8 MMOL/L (ref 7–15)
ATRIAL RATE - MUSE: 63 BPM
ATRIAL RATE - MUSE: 68 BPM
BUN SERPL-MCNC: 11.7 MG/DL (ref 6–20)
CALCIUM SERPL-MCNC: 9.7 MG/DL (ref 8.8–10.4)
CHLORIDE SERPL-SCNC: 107 MMOL/L (ref 98–107)
CREAT SERPL-MCNC: 1.04 MG/DL (ref 0.51–0.95)
DIASTOLIC BLOOD PRESSURE - MUSE: NORMAL MMHG
DIASTOLIC BLOOD PRESSURE - MUSE: NORMAL MMHG
EGFRCR SERPLBLD CKD-EPI 2021: 63 ML/MIN/1.73M2
ERYTHROCYTE [DISTWIDTH] IN BLOOD BY AUTOMATED COUNT: 12.4 % (ref 10–15)
GLUCOSE SERPL-MCNC: 84 MG/DL (ref 70–99)
HCG INTACT+B SERPL-ACNC: 2 MIU/ML
HCO3 SERPL-SCNC: 28 MMOL/L (ref 22–29)
HCT VFR BLD AUTO: 43 % (ref 35–47)
HGB BLD-MCNC: 14.9 G/DL (ref 11.7–15.7)
INTERPRETATION ECG - MUSE: NORMAL
INTERPRETATION ECG - MUSE: NORMAL
MCH RBC QN AUTO: 31.4 PG (ref 26.5–33)
MCHC RBC AUTO-ENTMCNC: 34.7 G/DL (ref 31.5–36.5)
MCV RBC AUTO: 91 FL (ref 78–100)
P AXIS - MUSE: 39 DEGREES
P AXIS - MUSE: 45 DEGREES
PLATELET # BLD AUTO: 218 10E3/UL (ref 150–450)
POTASSIUM SERPL-SCNC: 4.4 MMOL/L (ref 3.4–5.3)
PR INTERVAL - MUSE: 172 MS
PR INTERVAL - MUSE: 188 MS
QRS DURATION - MUSE: 82 MS
QRS DURATION - MUSE: 86 MS
QT - MUSE: 398 MS
QT - MUSE: 424 MS
QTC - MUSE: 423 MS
QTC - MUSE: 433 MS
R AXIS - MUSE: -15 DEGREES
R AXIS - MUSE: 3 DEGREES
RBC # BLD AUTO: 4.74 10E6/UL (ref 3.8–5.2)
SODIUM SERPL-SCNC: 143 MMOL/L (ref 135–145)
SYSTOLIC BLOOD PRESSURE - MUSE: NORMAL MMHG
SYSTOLIC BLOOD PRESSURE - MUSE: NORMAL MMHG
T AXIS - MUSE: -11 DEGREES
T AXIS - MUSE: -17 DEGREES
VENTRICULAR RATE- MUSE: 63 BPM
VENTRICULAR RATE- MUSE: 68 BPM
WBC # BLD AUTO: 5.2 10E3/UL (ref 4–11)

## 2025-01-09 PROCEDURE — 93010 ELECTROCARDIOGRAM REPORT: CPT | Performed by: INTERNAL MEDICINE

## 2025-01-09 PROCEDURE — 999N000054 HC STATISTIC EKG NON-CHARGEABLE

## 2025-01-09 PROCEDURE — 33262 RMVL& REPLC PULSE GEN 1 LEAD: CPT | Mod: GC | Performed by: INTERNAL MEDICINE

## 2025-01-09 PROCEDURE — 250N000009 HC RX 250: Performed by: INTERNAL MEDICINE

## 2025-01-09 PROCEDURE — 99152 MOD SED SAME PHYS/QHP 5/>YRS: CPT | Mod: GC | Performed by: INTERNAL MEDICINE

## 2025-01-09 PROCEDURE — 85018 HEMOGLOBIN: CPT | Performed by: INTERNAL MEDICINE

## 2025-01-09 PROCEDURE — 82435 ASSAY OF BLOOD CHLORIDE: CPT | Performed by: INTERNAL MEDICINE

## 2025-01-09 PROCEDURE — 250N000011 HC RX IP 250 OP 636: Performed by: INTERNAL MEDICINE

## 2025-01-09 PROCEDURE — 370N000017 HC ANESTHESIA TECHNICAL FEE, PER MIN: Performed by: INTERNAL MEDICINE

## 2025-01-09 PROCEDURE — 80048 BASIC METABOLIC PNL TOTAL CA: CPT | Performed by: INTERNAL MEDICINE

## 2025-01-09 PROCEDURE — 99152 MOD SED SAME PHYS/QHP 5/>YRS: CPT | Performed by: INTERNAL MEDICINE

## 2025-01-09 PROCEDURE — 272N000001 HC OR GENERAL SUPPLY STERILE: Performed by: INTERNAL MEDICINE

## 2025-01-09 PROCEDURE — 36415 COLL VENOUS BLD VENIPUNCTURE: CPT | Performed by: INTERNAL MEDICINE

## 2025-01-09 PROCEDURE — 84702 CHORIONIC GONADOTROPIN TEST: CPT | Performed by: NURSE PRACTITIONER

## 2025-01-09 PROCEDURE — C1722 AICD, SINGLE CHAMBER: HCPCS | Performed by: INTERNAL MEDICINE

## 2025-01-09 PROCEDURE — 99153 MOD SED SAME PHYS/QHP EA: CPT | Performed by: INTERNAL MEDICINE

## 2025-01-09 PROCEDURE — 250N000011 HC RX IP 250 OP 636: Performed by: REGISTERED NURSE

## 2025-01-09 PROCEDURE — 33262 RMVL& REPLC PULSE GEN 1 LEAD: CPT | Performed by: INTERNAL MEDICINE

## 2025-01-09 PROCEDURE — 93005 ELECTROCARDIOGRAM TRACING: CPT

## 2025-01-09 PROCEDURE — 82374 ASSAY BLOOD CARBON DIOXIDE: CPT | Performed by: INTERNAL MEDICINE

## 2025-01-09 PROCEDURE — 250N000009 HC RX 250: Performed by: REGISTERED NURSE

## 2025-01-09 PROCEDURE — 93641 EP EVL 1/2CHMB PAC CVDFB TST: CPT

## 2025-01-09 DEVICE — GENERATOR EMBLEM S-ICD MRI: Type: IMPLANTABLE DEVICE | Status: FUNCTIONAL

## 2025-01-09 RX ORDER — LIDOCAINE HYDROCHLORIDE 20 MG/ML
INJECTION, SOLUTION INFILTRATION; PERINEURAL
Status: DISCONTINUED | OUTPATIENT
Start: 2025-01-09 | End: 2025-01-09 | Stop reason: HOSPADM

## 2025-01-09 RX ORDER — OXYCODONE AND ACETAMINOPHEN 5; 325 MG/1; MG/1
1 TABLET ORAL EVERY 4 HOURS PRN
Status: DISCONTINUED | OUTPATIENT
Start: 2025-01-09 | End: 2025-01-09 | Stop reason: HOSPADM

## 2025-01-09 RX ORDER — FENTANYL CITRATE 50 UG/ML
INJECTION, SOLUTION INTRAMUSCULAR; INTRAVENOUS
Status: DISCONTINUED | OUTPATIENT
Start: 2025-01-09 | End: 2025-01-09 | Stop reason: HOSPADM

## 2025-01-09 RX ORDER — CEPHALEXIN 500 MG/1
500 TABLET, FILM COATED ORAL 3 TIMES DAILY
Qty: 15 TABLET | Refills: 0 | Status: SHIPPED | OUTPATIENT
Start: 2025-01-09 | End: 2025-01-14

## 2025-01-09 RX ORDER — ACETAMINOPHEN AND CODEINE PHOSPHATE 300; 30 MG/1; MG/1
1 TABLET ORAL EVERY 6 HOURS PRN
Qty: 12 TABLET | Refills: 0 | Status: SHIPPED | OUTPATIENT
Start: 2025-01-09

## 2025-01-09 RX ORDER — PROPOFOL 10 MG/ML
INJECTION, EMULSION INTRAVENOUS CONTINUOUS PRN
Status: DISCONTINUED | OUTPATIENT
Start: 2025-01-09 | End: 2025-01-09

## 2025-01-09 RX ORDER — LIDOCAINE 40 MG/G
CREAM TOPICAL
Status: DISCONTINUED | OUTPATIENT
Start: 2025-01-09 | End: 2025-01-09 | Stop reason: HOSPADM

## 2025-01-09 RX ORDER — SODIUM CHLORIDE 9 MG/ML
INJECTION, SOLUTION INTRAVENOUS CONTINUOUS
Status: DISCONTINUED | OUTPATIENT
Start: 2025-01-09 | End: 2025-01-09 | Stop reason: HOSPADM

## 2025-01-09 RX ORDER — CEFAZOLIN SODIUM 2 G/100ML
2 INJECTION, SOLUTION INTRAVENOUS
Status: COMPLETED | OUTPATIENT
Start: 2025-01-09 | End: 2025-01-09

## 2025-01-09 RX ADMIN — METHOCARBAMOL 40 MG: 500 TABLET ORAL at 15:55

## 2025-01-09 RX ADMIN — METHOCARBAMOL 10 MG: 500 TABLET ORAL at 15:58

## 2025-01-09 RX ADMIN — CEFAZOLIN SODIUM 2 G: 2 INJECTION, SOLUTION INTRAVENOUS at 14:38

## 2025-01-09 RX ADMIN — PROPOFOL 30 MCG/KG/MIN: 10 INJECTION, EMULSION INTRAVENOUS at 15:58

## 2025-01-09 ASSESSMENT — ACTIVITIES OF DAILY LIVING (ADL)
ADLS_ACUITY_SCORE: 41

## 2025-01-09 NOTE — H&P
Electrophysiology Pre-Procedure History and Physical    Lauren Lewis MRN# [id number]   Age: 54 year old YOB: 1970      Date of Procedure: 1/9/2025  [facility]      Date of Exam 1/9/2025 Facility (Same day)       HPI:  Ms. Lewis is a 53 year old female who has a medical history significant for HCM With MYBPC3 mutation, VF arrest s/p secondary prevention S-ICD 9/2015. She had out of hospital resuscitated VF arrest and had S-ICD implanted as secondary prevention. Normal device function.  Device has now reached RRT and she presents today for generator change.        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 (c.927-2A>G) 10/09/2015     Priority: Medium    AICD (automatic cardioverter/defibrillator) present- [employer]- Subcutaneous- NOT dependent 10/09/2015     Priority: Medium    H/O Graves' disease 10/09/2015     Priority: Medium    Hypothyroidism due to medication 10/09/2015     Priority: Medium     S/p radioactive iodine ablation              Medications (include herbals and vitamins):      Current Facility-Administered Medications   Medication Dose Route Frequency Provider Last Rate Last Admin    ceFAZolin (ANCEF) 2 g in 100 mL D5W intermittent infusion  2 g Intravenous Pre-Op/Pre-procedure x 1 dose Gael Michel MD        lidocaine (LMX4) cream   Topical Q1H PRN Gael Michel MD        lidocaine 1 % 0.1-1 mL  0.1-1 mL Other Q1H PRGael Mcneil MD        sodium chloride (PF) 0.9% PF flush 3 mL  3 mL Intracatheter Q8H Gael Michel MD        sodium chloride (PF) 0.9% PF flush 3 mL  3 mL  Physical Therapy  Visit Type: treatment  Precautions:  Medical precautions:  fall risk; standard precautions.    Safety Measures: chair alarm    SUBJECTIVE  Patient agreed to participate in therapy this date.  \"You are all so helpful.\"  Patient / Family Goal: maximize function     OBJECTIVE   Level of consciousness: alert    Oriented to person, place and time     Arousal alertness: appropriate responses to stimuli    Affect/Behavior: impulsive  Patient activity tolerance: 1 to 2 activity to rest    Bed Mobility:        Supine to sit: minimal assist and with verbal cues  Training completed:      Vcs for reaching for rail and advancement of BLE out of bed. Pt requires physical assist to advance trunk  Transfers:    Assistive devices: 2-wheeled walker, gait belt and 1 person    Sit to stand: minimal assist, moderate assist and with verbal cues    Stand to sit: moderate assist and with verbal cues  Training completed:      Vcs for pushing from sitting surface and anterior weight shift. Pt require cues repeated prior to each attempt. Pt attempting to sit without sitting surface behind person increasing risk for falls.   Gait/Ambulation:     Assistance: moderate assist, minimal assist and with verbal cues   Assistive device: 2-wheeled walker, gait belt and 1 person (w/c for pacing)    Distance (ft): 10; 20; 30; 50    Pattern: step to    Type: ataxic, difficulty advancing assistive device and excessive flexed posture    Deviation in foot placement: narrow base of support  Training Completed:    Tasks: gait training on level surfaces, door management and assistive device use    Vcs for WW management, ambulating within WW, increased step width. Pt demonstrates L lateral lean which is increased as pt fatigues. Pt demonstrates poor obstacle negotiation increasing risk for falls.       Interventions     Training provided: activity tolerance, balance retraining, bed mobility training, functional ambulation, transfer training,  Intracatheter Q1H PRN Gael Michel MD        sodium chloride (PF) 0.9% PF flush 3 mL  3 mL Intracatheter q1 min prn Gael Michel MD        sodium chloride 0.9 % infusion   Intravenous Continuous Gael Michel MD               Medication List      There are no discharge medications for this visit.              Allergies:    No Known Allergies          Social History:     Social History     Tobacco Use    Smoking status: Never    Smokeless tobacco: Never   Substance Use Topics    Alcohol use: Yes     Comment: rarely            Physical Exam:   All vitals have been reviewed  Patient Vitals for the past 8 hrs:   BP Temp Temp src Pulse Resp SpO2 Weight   01/09/25 1233 120/82 97.8  F (36.6  C) Oral 76 16 96 % 86.7 kg (191 lb 3.2 oz)     No intake/output data recorded.  Airway assessment:   Patient is able to open mouth wide  Patient is able to stick out tongue}      ENT:   Normocephalic, without obvious abnormality, atraumatic, sinuses nontender on palpation, external ears without lesions, oral pharynx with moist mucous membranes, tonsils without erythema or exudates, gums normal and good dentition.     Neck:   Supple, symmetrical, trachea midline, no adenopathy, thyroid symmetric, not enlarged and no tenderness, skin normal     Lungs:   No increased work of breathing, good air exchange, clear to auscultation bilaterally, no crackles or wheezing     Cardiovascular:   Normal apical impulse, regular rate and rhythm, normal S1 and S2, no S3 or S4, and no murmur noted             Lab / Radiology Results:     Lab Results   Component Value Date    WBC 5.9 04/09/2024    WBC 8.2 12/12/2017    RBC 5.11 04/09/2024    RBC 4.46 12/12/2017    HGB 15.6 04/09/2024    HGB 14.2 12/12/2017    HCT 45.5 04/09/2024    HCT 42.3 12/12/2017    MCV 89 04/09/2024    MCV 95 12/12/2017    RDW 12.4 04/09/2024    RDW 11.7 12/12/2017     04/09/2024     12/12/2017      Lab Results   Component Value Date    WBC 5.9 04/09/2024    WBC  use of assistive device and safety training    Skilled input: Verbal instruction/cues and tactile instruction/cues  Verbal Consent: Writer verbally educated and received verbal consent for hand placement, positioning of patient, and techniques to be performed today from patient for hand placement and palpation for techniques, clothing adjustments for techniques and therapist position for techniques as described above and how they are pertinent to the patient's plan of care.       ASSESSMENT    Impairments: range of motion, strength, balance deficits, safety awareness, pain, activity tolerance and endurance  Functional Limitations: all functional mobility  Patient seen on med/surg nursing unit.  Today's treatment focused on gait training, bed mobility, and transfer training.  The patient is demonstrating fair progress as evidenced by increased ambulation distance and less assist for transfers.  At this time the patient continues to demonstrate impairments in activity tolerance, balance, limited knowledge of compensatory strategies, postural control, safety awareness and strength which is limiting the performance of all functional mobility.  Further skilled physical therapy services are reasonable and necessary to address the above impairments and performance deficits.       Discharge Recommendations  Recommendation for Discharge: PT WI: Sub-acute nursing home, Less intensive rehab         PT/OT Mobility Equipment for Discharge: has 2WW   PT/OT ADL Equipment for Discharge: continue to assess  PT Identified Barriers to Discharge: limited balance, strength and safety with functional mobility     Skilled therapy is required to address these limitations in attempt to maximize the patient's independence.  Progress: slow progress, decreased activity tolerance    End of Session:   Location: in chair  Safety measures: call light within reach, lines intact and alarm system in place/re-engaged  Handoff to: nurse  Education  8.2 12/12/2017     Lab Results   Component Value Date     04/09/2024     12/12/2017     Lab Results   Component Value Date    HGB 15.6 04/09/2024    HGB 14.2 12/12/2017    HCT 45.5 04/09/2024    HCT 42.3 12/12/2017     Lab Results   Component Value Date     04/09/2024     01/22/2020    CO2 25 04/09/2024    CO2 29 01/28/2022    CO2 27 01/22/2020    BUN 13.9 04/09/2024    BUN 14 01/28/2022    BUN 10 01/22/2020     Lab Results   Component Value Date     04/09/2024     01/22/2020    CO2 25 04/09/2024    CO2 29 01/28/2022    CO2 27 01/22/2020    BUN 13.9 04/09/2024    BUN 14 01/28/2022    BUN 10 01/22/2020     Lab Results   Component Value Date    TSH 4.20 12/12/2017          Plan:   Patient's active problems diagnostically and therapeutically optimized for the planned procedure. Patient here for ICD generator change. Procedure explained in detail to patient including indications, risks, and benefits. Moderate sedation is required for this procedure and the risks, benefits and alternatives to moderate sedation were discussed. Patient also understands risks and complications of the procedure which include but are not limited to bruising/swelling around the incision site, infection, bleeding, allergic reaction to local anesthetic, air embolism, arterial puncture, stroke, heart attack, need for emergency surgery, death. Patient verbalized understanding of risks and benefits and has elected to proceed with the procedure or procedures listed above.    KEITH Roche CNP  Electrophysiology Consult Service  Securely message with [facility]   Text page via [facility] Paging/Directory           Provided:   Learning assessment:  - Primary learner: patient  - Are they ready to learn: yes  - Preferred learning style: verbal  - Barriers to learning: cognitive  Education provided during session:  - Receiving education: patient  - Results of above outlined education: Verbalizes understanding and Demonstrates understanding    PLAN    Suggestions for next session as indicated: Pending pt presentation and abilities next session will work on independence with transfers, gait distance.    PT Frequency: Once a day, 4 days/week  Frequency Comments: 1/4 by 1/28      Interventions: balance, body mechanics, compensatory technique education, energy conservation, gait training, neuromuscular re-education, ROM, strengthening, safety education, patient/family training, HEP train/position, equipment eval/education, continued evaluation, bed mobility, endurance training, functional transfer training and stairs retraining  Agreement to plan and goals: patient agrees with goals and treatment plan        GOALS  Review Date: 1/28/2022  Long Term Goals: (to be met by time of discharge from hospital)  Sit to supine: Patient will complete sit to supine modified independent.  Status: progressing/ongoing  Supine to sit: Patient will complete supine to sit modified independent.  Status: progressing/ongoing  Sit to stand: Patient will complete sit to stand transfer with 2-wheeled walker, modified independent.   Status: progressing/ongoing  Stand to sit: Patient will complete stand to sit transfer with 2-wheeled walker, modified independent.   Status: progressing/ongoing  Ambulation (even): Patient will ambulate on even surface for 50 feet with 2-wheeled walker, modified independent.   Status: progressing/ongoing    Documented in the chart in the following areas: Pain. Assessment.      Therapy procedure time and total treatment time can be found documented on the Time Entry flowsheet

## 2025-01-09 NOTE — OP NOTE
PRE-OPERATIVE DIAGNOSIS:  Hypertrophic cardiomyopathy, device at [facility]    POST-OPERATIVE DIAGNOSIS:    Successful replacement of a subcutaneous implantable cardiac defibrillator (ICD) with pocket revision     PROCEDURES PERFORMED:  - Implantation of subcutaneous implantable cardioverter-defibrillator  - Pocket revision  - Defibrillation threshold testing    CLINICAL HISTORY:  54 year old female with a history of hypertrophic cardiomyopathy s/p subcutaneous ICD implanted for primary prevention.    Of note, the impedance was noted to be elevated today at 130 Ohms.  Prior checks have also shown higher impedance of 120 Ohms for the past few years.    The risks and benefits of the procedure were discussed in detail; the patient expressed understanding and provided consent.       PROCEDURE:  The benefits and risks of the procedure were discussed with the patient in detail; the patient expressed understanding.  Informed consent was obtained and placed in the chart.  I determined this patient to be an appropriate candidate for the planned sedation and procedure and have reassessed the patient immediately prior to sedation and procedure. The patient was brought to the EP lab in the fasting, non-sedated state.  We continuously monitored EKG, vital signs, oxygenation and level of sedation.  I was present during the entire time that conscious sedation was provided.  Antibiotic prophylaxis was administered.      An incision was made in the left anterior axilla over the existing scar.  Using electrocautery and blunt dissection, the subcutaneous tissue was dissected to the device pocket and generator was removed from the pocket.  We carefully dissected between the muscle to create a new submuscular pocket.  Examination of the pocket was performed carefully and electrocautery was performed to achieve hemostasis.    The incision site and pocket were flushed with copious normal saline. The lead was secured in the header of the  generator and the generator was positioned in the submuscular pocket. A stay suture was used.      The incisions were closed in layers. The muscle/fascia was closed with 2-0 Vicryl. The subcutaneous tissue was closed using 3-0 and the subcuticular layer using 4-0 VLoc. Dermabond was applied to the skin. The incision was covered with a sterile dressing.    Initial impedance was measured at 125 Ohms so we decided to proceed with DFT testing.  The anesthesia team came in to provide sedation.  DFT testing was performed as below. Shock impedance was noted to be 140 Ohms.    On repeat testing, the impedance had decreased further to 95 Ohms.    There was minimal blood loss (<30 mL) and no immediate complications.  The patient tolerated the procedure well.    Implanted Hardware and Parameters:  Implant Name Type Inv. Item Serial No.  Lot No. LRB No. Used Action   GENERATOR EMBLEM S-ICD MRI - [id number] ICD GENERATOR EMBLEM S-ICD MRI [id number] Cash Check Card [id number]  1 Implanted             CONCLUSIONS:  - Successful placement of a subcutaneous implantable cardioverter-defibrillator  - Defibrillation threshold testing with successful shock at 65J  - Routine follow up after discharge      Onofre Shah MD

## 2025-01-09 NOTE — ANESTHESIA CARE TRANSFER NOTE
Patient: Lauren Lewis    Procedure: Procedure(s):  Subcutaneous Implantable Cardioverter Defibrillator Generator Replacement       Diagnosis: Device reached RRT  Diagnosis Additional Information: No value filed.    Anesthesia Type:   No value filed.     Note:    Oropharynx: oropharynx clear of all foreign objects  Level of Consciousness: drowsy  Oxygen Supplementation: nasal cannula  Level of Supplemental Oxygen (L/min / FiO2): 4  Independent Airway: airway patency satisfactory and stable  Dentition: dentition unchanged  Vital Signs Stable: post-procedure vital signs reviewed and stable  Report to RN Given: handoff report given  Patient transferred to: Cardiac Special Care          Vitals:  Vitals Value Taken Time   BP     Temp     Pulse     Resp     SpO2         Electronically Signed By: KEITH Shepherd CRNA  January 9, 2025  4:08 PM

## 2025-01-09 NOTE — PRE-PROCEDURE
GENERAL PRE-PROCEDURE:   Procedure:  Defibrillator generator change  Date/Time:  1/9/2025 12:50 PM    Written consent obtained?: Yes    Risks and benefits: Risks, benefits and alternatives were discussed    Consent given by:  Patient  Patient states understanding of procedure being performed: Yes    Patient's understanding of procedure matches consent: Yes    Procedure consent matches procedure scheduled: Yes    Expected level of sedation:  Moderate  Appropriately NPO:  Yes  ASA Class:  3  Mallampati  :  Grade 3- soft palate visible, posterior pharyngeal wall not visible  Lungs:  Lungs clear with good breath sounds bilaterally  Heart:  Normal heart sounds and rate  History & Physical reviewed:  History and physical reviewed and no updates needed  Statement of review:  I have reviewed the lab findings, diagnostic data, medications, and the plan for sedation

## 2025-01-09 NOTE — PROGRESS NOTES
Arrived from home for a pacemaker generator change.  VSS.  Denies pain.  Left generator site cleaned with Eliseo wipes.  H&P current.  Consent obtained.  Ready for procedure.

## 2025-01-09 NOTE — PROGRESS NOTES
Lauren arrived back s/p ICD Generator change  She is AxOx4, was able to wean oxygen to room air upon arrival. SR, normotensive.  She is eating and drinking.   at bedside.  CXR and EKG done  BS Rep at bedside

## 2025-01-09 NOTE — Clinical Note
Report is called to 2A Elsie KRAMER. The procedure and outcome is reviewed. The pt is returned to 2A per stretcher in awake and stable condition.

## 2025-01-09 NOTE — DISCHARGE INSTRUCTIONS
Home Care after an ICD Battery Change    Wound care:  Keep your incision (surgery wound) dry for 3 days.  After 3 days, you may remove the outer bandage.  Keep the strips of tape on.  They will be removed at your clinic visit.  Check for signs of infection each day.  These include increased redness, swelling, drainage or a fever over 101 F (38.3 C).  Call us immediately if you see any of these signs.  If there are no signs of infection, you may shower in 3 days.  Do not submerge the incision (in a bath tub, hot tub, or swimming pool) until fully healed.    Pain:   You may have mild to moderate pain for 3 to 5 days.  Take acetaminophen (Tylenol) or ibuprofen (Advil) for the pain.  Call us if the pain is severe or lasts more than 5 days.    Activity:  After 24 hours, slowly return to your normal activities.  Healing will take 4 to 6 weeks.  Do not drive for 24 hours.  For 3 days, do not raise your affected arm above your shoulder.  For 10 days, do not use your affected arm to push, pull or lift anything over 10 pounds.  Avoid climbing a ladder alone. It is best to stay within 4 feet off the ground.  Do not go swimming or boating alone.   Avoid anything that may cause rough contact or a hard hit to your chest.  This includes football, hockey, and other contact sports.    Telling others about your device:  Before you have any medical tests or treatments, tell the doctors, dentists, and other care providers about your device.  There are a few tests and treatments that may interfere with your device.  (These include MRI, radiation therapy, electrocautery, and others.)  Your care team may need to take special steps to keep you safe.  Before you leave the hospital, you will receive a temporary ID card.  A permanent card will be mailed to you about 6 to 8 weeks later.  Always carry the ID card with you.  It has important details about your device.  You should also get a MedicAlert ID.  Please ask us for a MedicAlert  brochure, or go to [email or web address].    Safety near electrical equipment:  All of these are safe to use when in good repair -  Microwaves  Radios  Cordless phone  Remote controls  Small electrical tools  Cell phones: Keep cell phones at least 6 inches from your device.  Do not carry the phone in a pocket near your device.  Security watson: It is okay to walk through security watson at the airports and department stores.  Tell airport security that you have a defibrillator.  They should keep the screening wand at least 6 inches from your device.  Full-body scanners are safe.    Avoid the following:    Arc welding, chain saws and high-powered industrial or commercial tools.  Power lines, power plants and large power generators.  Electric body fat scales.  Magnetic mattress pads or pillow.    What to do after a shock from your ICD:  1.      Stop what you re doing and rest.  2.      If you feel fine before and after the shock, call the device clinic.  3.      If you feel unwell or receive more than one shock, call 911 or go to the          emergency room.  A shock could mean that your condition has changed and you may need to see a doctor.    Follow-Up Visits:  Return to the clinic in 7 to 10 days to have your device and wound checked.    Device follow-up after your initial clinic visit will take place every 3 months and as needed until your battery reaches end of service. The device follow up will take place in person or remotely utilizing your home monitor.    Questions?  Please call [facility].   General inquiry: [phone]   Device Nurse:          Business Hours:  [phone]                          After Hours:  [phone]   Choose option 4, then ask for the                                          on-call device nurse at job code [id number].    Your next device clinic appointment is scheduled on:     January 16th 2025 @ 9:00 am.                             [facility]  [facility] [address]  [address] [place]  [address]

## 2025-01-09 NOTE — Clinical Note
dry, intact, no bleeding and no hematoma. The left lateral chest incision is covered with a large Primapore DSG.

## 2025-01-10 LAB
ATRIAL RATE - MUSE: 63 BPM
ATRIAL RATE - MUSE: 68 BPM
DIASTOLIC BLOOD PRESSURE - MUSE: NORMAL MMHG
DIASTOLIC BLOOD PRESSURE - MUSE: NORMAL MMHG
INTERPRETATION ECG - MUSE: NORMAL
INTERPRETATION ECG - MUSE: NORMAL
P AXIS - MUSE: 39 DEGREES
P AXIS - MUSE: 45 DEGREES
PR INTERVAL - MUSE: 172 MS
PR INTERVAL - MUSE: 188 MS
QRS DURATION - MUSE: 82 MS
QRS DURATION - MUSE: 86 MS
QT - MUSE: 398 MS
QT - MUSE: 424 MS
QTC - MUSE: 423 MS
QTC - MUSE: 433 MS
R AXIS - MUSE: -15 DEGREES
R AXIS - MUSE: 3 DEGREES
SYSTOLIC BLOOD PRESSURE - MUSE: NORMAL MMHG
SYSTOLIC BLOOD PRESSURE - MUSE: NORMAL MMHG
T AXIS - MUSE: -11 DEGREES
T AXIS - MUSE: -17 DEGREES
VENTRICULAR RATE- MUSE: 63 BPM
VENTRICULAR RATE- MUSE: 68 BPM

## 2025-01-10 NOTE — PROGRESS NOTES
D/I/A:  Patient is tolerating liquids and foods, ambulating and voiding. ICD site unchanged, no bleeding or hematoma; CMS intact.  VSSA.  SR on monitor.  IV access removed.  Education reviewed and outlined in AVS.  picked up medications at discharge pharmacy and meds reviewed with patient and .  Questions answered prior to discharge.  Belongings returned to patient at discharge.    P: Discharged to home. Escorted patient to lobby and met  in front of hospital with car.

## 2025-01-13 NOTE — ANESTHESIA PREPROCEDURE EVALUATION
Anesthesia Pre-Procedure Evaluation    Patient: Lauren Lewis   MRN: [id number] : 1970        Procedure : Procedure(s):  Subcutaneous Implantable Cardioverter Defibrillator Generator Replacement          Past Medical History:   Diagnosis Date    Afferent pupillary defect     RE    AICD (automatic cardioverter/defibrillator) present 2015    Cardiac arrest (H) 2015    Enlarged blind spot     RE    H/O Graves' disease 1987    Radioactive iodine treatment    H/O seasonal allergies     HOCM (hypertrophic obstructive cardiomyopathy) (H)     apical HOCM    Liver laceration 2015    from CPR; s/p hepatic embolization    Thyroid disease     Visual field loss 2013    RE / temporal      Past Surgical History:   Procedure Laterality Date    ABDOMEN SURGERY      c- section x 1     COLONOSCOPY N/A 2018    Procedure: COMBINED COLONOSCOPY, SINGLE OR MULTIPLE BIOPSY/POLYPECTOMY BY BIOPSY;  Surgeon: Marina Morris MD;  Location:  GI    COLONOSCOPY N/A 7/3/2023    Procedure: COLONOSCOPY;  Surgeon: Gera Butt MD;  Location: [facility] Main OR    DILATION AND CURETTAGE      IMPLANT AUTOMATIC IMPLANTABLE CARDIOVERTER DEFIBRILLATOR        No Known Allergies   Social History     Tobacco Use    Smoking status: Never    Smokeless tobacco: Never   Substance Use Topics    Alcohol use: Yes     Comment: rarely      Wt Readings from Last 1 Encounters:   25 86.7 kg (191 lb 3.2 oz)        Anesthesia Evaluation   Pt has had prior anesthetic.     No history of anesthetic complications       ROS/MED HX  ENT/Pulmonary:  - neg pulmonary ROS     Neurologic:  - neg neurologic ROS     Cardiovascular: Comment: Echo :  Interpretation Summary  Global and regional left ventricular function is normal with an EF of 55-60%.  Left ventricular wall thickness is normal.  Global right ventricular function is normal.  Mild right ventricular dilation is present.    (+)  - -   -  - -     "               ICD           congenital heart disease (HOCM)        METS/Exercise Tolerance:     Hematologic:       Musculoskeletal:       GI/Hepatic:  - neg GI/hepatic ROS     Renal/Genitourinary:       Endo:     (+)          thyroid problem,            Psychiatric/Substance Use:       Infectious Disease:       Malignancy:       Other:            Physical Exam    Airway        Mallampati: II   TM distance: > 3 FB   Neck ROM: full   Mouth opening: > 3 cm    Respiratory Devices and Support         Dental       (+) Minor Abnormalities - some fillings, tiny chips      Cardiovascular   cardiovascular exam normal          Pulmonary   pulmonary exam normal                OUTSIDE LABS:  CBC:   Lab Results   Component Value Date    WBC 5.2 01/09/2025    WBC 5.9 04/09/2024    HGB 14.9 01/09/2025    HGB 15.6 04/09/2024    HCT 43.0 01/09/2025    HCT 45.5 04/09/2024     01/09/2025     04/09/2024     BMP:   Lab Results   Component Value Date     01/09/2025     04/09/2024    POTASSIUM 4.4 01/09/2025    POTASSIUM 4.6 04/09/2024    CHLORIDE 107 01/09/2025    CHLORIDE 105 04/09/2024    CO2 28 01/09/2025    CO2 25 04/09/2024    BUN 11.7 01/09/2025    BUN 13.9 04/09/2024    CR 1.04 (H) 01/09/2025    CR 1.16 (H) 04/09/2024    GLC 84 01/09/2025    GLC 94 04/09/2024     COAGS: No results found for: \"PTT\", \"INR\", \"FIBR\"  POC:   Lab Results   Component Value Date    HCGS Negative 10/27/2015     HEPATIC:   Lab Results   Component Value Date    ALBUMIN 4.3 04/09/2024    PROTTOTAL 7.2 04/09/2024    ALT 27 04/09/2024    AST 25 04/09/2024    ALKPHOS 90 04/09/2024    BILITOTAL 0.5 04/09/2024     OTHER:   Lab Results   Component Value Date    ZAKIA 9.7 01/09/2025    TSH 4.20 (H) 12/12/2017    T4 1.24 12/12/2017       Anesthesia Plan    ASA Status:  3    NPO Status:  NPO Appropriate    Anesthesia Type: General.     - Airway: Native airway              Consents    Anesthesia Plan(s) and associated risks, benefits, and " realistic alternatives discussed. Questions answered and patient/representative(s) expressed understanding.     - Discussed:     - Discussed with:  Patient            Postoperative Care            Comments:               KIRSTEN NEWTON MD    I have reviewed the pertinent notes and labs in the chart from the past 30 days and (re)examined the patient.  Any updates or changes from those notes are reflected in this note.                              # ICD device present

## 2025-01-13 NOTE — ANESTHESIA POSTPROCEDURE EVALUATION
Patient: Lauren Lewis    Procedure: Procedure(s):  Subcutaneous Implantable Cardioverter Defibrillator Generator Replacement       Anesthesia Type:  General    Note:  Disposition: Outpatient   Postop Pain Control: Uneventful            Sign Out: Well controlled pain   PONV: No   Neuro/Psych: Uneventful            Sign Out: Acceptable/Baseline neuro status   Airway/Respiratory: Uneventful            Sign Out: Acceptable/Baseline resp. status   CV/Hemodynamics: Uneventful            Sign Out: Acceptable CV status; No obvious hypovolemia; No obvious fluid overload   Other NRE: NONE   DID A NON-ROUTINE EVENT OCCUR? No           Last vitals:  Vitals Value Taken Time   /84 01/09/25 1715   Temp 36.8  C (98.2  F) 01/09/25 1630   Pulse 71 01/09/25 1730   Resp 15 01/09/25 1730   SpO2 98 % 01/09/25 1730       Electronically Signed By: KIRSTEN NEWTON MD  January 13, 2025  2:15 PM

## 2025-01-16 ENCOUNTER — ANCILLARY PROCEDURE (OUTPATIENT)
Dept: CARDIOLOGY | Facility: CLINIC | Age: 55
End: 2025-01-16
Attending: INTERNAL MEDICINE
Payer: COMMERCIAL

## 2025-01-16 DIAGNOSIS — Z95.810 AICD (AUTOMATIC CARDIOVERTER/DEFIBRILLATOR) PRESENT: ICD-10-CM

## 2025-01-16 DIAGNOSIS — I46.9 CARDIAC ARREST (H): ICD-10-CM

## 2025-01-16 LAB
MDC_IDC_LEAD_CONNECTION_STATUS: NORMAL
MDC_IDC_LEAD_IMPLANT_DT: NORMAL
MDC_IDC_LEAD_LOCATION: NORMAL
MDC_IDC_LEAD_LOCATION_DETAIL_1: NORMAL
MDC_IDC_LEAD_MFG: NORMAL
MDC_IDC_LEAD_MODEL: NORMAL
MDC_IDC_LEAD_POLARITY_TYPE: NORMAL
MDC_IDC_LEAD_SERIAL: NORMAL
MDC_IDC_LEAD_SPECIAL_FUNCTION: NORMAL
MDC_IDC_PG_IMPLANT_DTM: NORMAL
MDC_IDC_PG_MFG: NORMAL
MDC_IDC_PG_MODEL: NORMAL
MDC_IDC_PG_SERIAL: NORMAL
MDC_IDC_PG_TYPE: NORMAL
MDC_IDC_SESS_CLINIC_NAME: NORMAL
MDC_IDC_SESS_DTM: NORMAL
MDC_IDC_SESS_TYPE: NORMAL

## 2025-01-16 PROCEDURE — 93260 PRGRMG DEV EVAL IMPLTBL SYS: CPT | Performed by: INTERNAL MEDICINE

## 2025-05-23 ENCOUNTER — ANCILLARY PROCEDURE (OUTPATIENT)
Dept: CARDIOLOGY | Facility: CLINIC | Age: 55
End: 2025-05-23
Attending: INTERNAL MEDICINE
Payer: COMMERCIAL

## 2025-05-23 DIAGNOSIS — Z95.810 AICD (AUTOMATIC CARDIOVERTER/DEFIBRILLATOR) PRESENT: ICD-10-CM

## 2025-05-23 DIAGNOSIS — I46.9 CARDIAC ARREST (H): ICD-10-CM

## 2025-05-23 LAB
MDC_IDC_LEAD_CONNECTION_STATUS: NORMAL
MDC_IDC_LEAD_IMPLANT_DT: NORMAL
MDC_IDC_LEAD_LOCATION: NORMAL
MDC_IDC_LEAD_LOCATION_DETAIL_1: NORMAL
MDC_IDC_LEAD_MFG: NORMAL
MDC_IDC_LEAD_MODEL: NORMAL
MDC_IDC_LEAD_POLARITY_TYPE: NORMAL
MDC_IDC_LEAD_SERIAL: NORMAL
MDC_IDC_LEAD_SPECIAL_FUNCTION: NORMAL
MDC_IDC_MSMT_BATTERY_DTM: NORMAL
MDC_IDC_MSMT_BATTERY_REMAINING_LONGEVITY: NORMAL
MDC_IDC_MSMT_BATTERY_STATUS: NORMAL
MDC_IDC_PG_IMPLANT_DTM: NORMAL
MDC_IDC_PG_MFG: NORMAL
MDC_IDC_PG_MODEL: NORMAL
MDC_IDC_PG_SERIAL: NORMAL
MDC_IDC_PG_TYPE: NORMAL
MDC_IDC_SESS_CLINIC_NAME: NORMAL
MDC_IDC_SESS_DTM: NORMAL
MDC_IDC_SESS_TYPE: NORMAL
MDC_IDC_SET_ZONE_TYPE: NORMAL
MDC_IDC_SET_ZONE_VENDOR_TYPE: NORMAL

## 2025-05-23 PROCEDURE — 93260 PRGRMG DEV EVAL IMPLTBL SYS: CPT | Performed by: INTERNAL MEDICINE

## 2025-05-23 NOTE — PATIENT INSTRUCTIONS
It was a pleasure to see you in clinic today, please do not hesitate to call us for questions or concerns.  Your next automatic remote ICD check from home is scheduled for 8/26/2025.    Paulina Waters RN    Electrophysiology Nurse Clinician  [facility]    During Business Hours Please Call:  [phone]  After Hours Please Call:  [phone] - select option #[phone] and ask for job code [id number]

## 2025-07-27 ENCOUNTER — HEALTH MAINTENANCE LETTER (OUTPATIENT)
Age: 55
End: 2025-07-27

## 2025-08-27 ENCOUNTER — ANCILLARY PROCEDURE (OUTPATIENT)
Dept: CARDIOLOGY | Facility: CLINIC | Age: 55
End: 2025-08-27
Attending: INTERNAL MEDICINE
Payer: COMMERCIAL

## 2025-08-27 DIAGNOSIS — I46.9 CARDIAC ARREST (H): ICD-10-CM

## 2025-08-27 DIAGNOSIS — Z95.810 AICD (AUTOMATIC CARDIOVERTER/DEFIBRILLATOR) PRESENT: ICD-10-CM

## 2025-08-27 PROCEDURE — 93296 REM INTERROG EVL PM/IDS: CPT

## 2025-08-29 LAB
MDC_IDC_LEAD_CONNECTION_STATUS: NORMAL
MDC_IDC_LEAD_IMPLANT_DT: NORMAL
MDC_IDC_LEAD_LOCATION: NORMAL
MDC_IDC_LEAD_LOCATION_DETAIL_1: NORMAL
MDC_IDC_LEAD_MFG: NORMAL
MDC_IDC_LEAD_MODEL: NORMAL
MDC_IDC_LEAD_POLARITY_TYPE: NORMAL
MDC_IDC_LEAD_SERIAL: NORMAL
MDC_IDC_LEAD_SPECIAL_FUNCTION: NORMAL
MDC_IDC_MSMT_BATTERY_DTM: NORMAL
MDC_IDC_MSMT_BATTERY_REMAINING_PERCENTAGE: 94 %
MDC_IDC_MSMT_BATTERY_STATUS: NORMAL
MDC_IDC_PG_IMPLANT_DTM: NORMAL
MDC_IDC_PG_MFG: NORMAL
MDC_IDC_PG_MODEL: NORMAL
MDC_IDC_PG_SERIAL: NORMAL
MDC_IDC_PG_TYPE: NORMAL
MDC_IDC_SESS_CLINIC_NAME: NORMAL
MDC_IDC_SESS_DTM: NORMAL
MDC_IDC_SESS_TYPE: NORMAL
MDC_IDC_SET_ZONE_DETECTION_INTERVAL: 240 MS
MDC_IDC_SET_ZONE_DETECTION_INTERVAL: 273 MS
MDC_IDC_SET_ZONE_STATUS: NORMAL
MDC_IDC_SET_ZONE_STATUS: NORMAL
MDC_IDC_SET_ZONE_TYPE: NORMAL
MDC_IDC_SET_ZONE_TYPE: NORMAL
MDC_IDC_SET_ZONE_VENDOR_TYPE: NORMAL
MDC_IDC_SET_ZONE_VENDOR_TYPE: NORMAL
MDC_IDC_STAT_EPISODE_RECENT_COUNT: 0
MDC_IDC_STAT_EPISODE_RECENT_COUNT_DTM_END: NORMAL
MDC_IDC_STAT_EPISODE_RECENT_COUNT_DTM_START: NORMAL
MDC_IDC_STAT_EPISODE_TOTAL_COUNT: 0
MDC_IDC_STAT_EPISODE_TOTAL_COUNT_DTM_END: NORMAL
MDC_IDC_STAT_EPISODE_TOTAL_COUNT_DTM_START: NORMAL
MDC_IDC_STAT_EPISODE_TYPE: NORMAL
MDC_IDC_STAT_EPISODE_VENDOR_TYPE: NORMAL
MDC_IDC_STAT_EPISODE_VENDOR_TYPE: NORMAL
MDC_IDC_STAT_TACHYTHERAPY_RECENT_DTM_END: NORMAL
MDC_IDC_STAT_TACHYTHERAPY_RECENT_DTM_START: NORMAL
MDC_IDC_STAT_TACHYTHERAPY_SHOCKS_DELIVERED_RECENT: 0
MDC_IDC_STAT_TACHYTHERAPY_SHOCKS_DELIVERED_TOTAL: 1
MDC_IDC_STAT_TACHYTHERAPY_TOTAL_DTM_END: NORMAL
MDC_IDC_STAT_TACHYTHERAPY_TOTAL_DTM_START: NORMAL

## (undated) DEVICE — TUBING SUCTION MEDI-VAC 1/4"X20' N620A - HE

## (undated) DEVICE — SOL WATER IRRIG 1000ML BOTTLE 2F7114

## (undated) DEVICE — SUCTION MANIFOLD NEPTUNE 2 SYS 1 PORT 702-025-000

## (undated) DEVICE — ELECTRODE DEFIB CADENCE 22550R

## (undated) DEVICE — Device

## (undated) RX ORDER — CEFAZOLIN SODIUM 2 G/100ML
INJECTION, SOLUTION INTRAVENOUS
Status: DISPENSED
Start: 2025-01-09

## (undated) RX ORDER — FENTANYL CITRATE 50 UG/ML
INJECTION, SOLUTION INTRAMUSCULAR; INTRAVENOUS
Status: DISPENSED
Start: 2018-11-19

## (undated) RX ORDER — SODIUM CHLORIDE 9 MG/ML
INJECTION, SOLUTION INTRAVENOUS
Status: DISPENSED
Start: 2025-01-09

## (undated) RX ORDER — FENTANYL CITRATE 50 UG/ML
INJECTION, SOLUTION INTRAMUSCULAR; INTRAVENOUS
Status: DISPENSED
Start: 2025-01-09